# Patient Record
Sex: MALE | Race: WHITE | NOT HISPANIC OR LATINO | URBAN - METROPOLITAN AREA
[De-identification: names, ages, dates, MRNs, and addresses within clinical notes are randomized per-mention and may not be internally consistent; named-entity substitution may affect disease eponyms.]

---

## 2017-02-21 ENCOUNTER — EMERGENCY (EMERGENCY)
Facility: HOSPITAL | Age: 67
LOS: 0 days | Discharge: LEFT BEFORE DISCHARGE | End: 2017-02-21

## 2017-02-22 ENCOUNTER — TRANSCRIPTION ENCOUNTER (OUTPATIENT)
Age: 67
End: 2017-02-22

## 2017-06-27 DIAGNOSIS — R06.02 SHORTNESS OF BREATH: ICD-10-CM

## 2017-06-27 DIAGNOSIS — R05 COUGH: ICD-10-CM

## 2017-06-27 DIAGNOSIS — I10 ESSENTIAL (PRIMARY) HYPERTENSION: ICD-10-CM

## 2020-06-08 ENCOUNTER — EMERGENCY (EMERGENCY)
Facility: HOSPITAL | Age: 70
LOS: 0 days | Discharge: HOME | End: 2020-06-08
Attending: EMERGENCY MEDICINE | Admitting: EMERGENCY MEDICINE
Payer: MEDICARE

## 2020-06-08 VITALS
OXYGEN SATURATION: 98 % | RESPIRATION RATE: 18 BRPM | DIASTOLIC BLOOD PRESSURE: 66 MMHG | SYSTOLIC BLOOD PRESSURE: 118 MMHG | HEART RATE: 70 BPM | TEMPERATURE: 98 F
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
## 2020-06-08 DIAGNOSIS — S05.01XA INJURY OF CONJUNCTIVA AND CORNEAL ABRASION WITHOUT FOREIGN BODY, RIGHT EYE, INITIAL ENCOUNTER: ICD-10-CM

## 2020-06-08 DIAGNOSIS — I10 ESSENTIAL (PRIMARY) HYPERTENSION: ICD-10-CM

## 2020-06-08 DIAGNOSIS — E78.5 HYPERLIPIDEMIA, UNSPECIFIED: ICD-10-CM

## 2020-06-08 DIAGNOSIS — R20.2 PARESTHESIA OF SKIN: ICD-10-CM

## 2020-06-08 DIAGNOSIS — E11.9 TYPE 2 DIABETES MELLITUS WITHOUT COMPLICATIONS: ICD-10-CM
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
## 2020-06-08 DIAGNOSIS — S09.90XA UNSPECIFIED INJURY OF HEAD, INITIAL ENCOUNTER: ICD-10-CM

## 2020-06-08 PROCEDURE — 70450 CT HEAD/BRAIN W/O DYE: CPT | Mod: 26

## 2020-06-08 PROCEDURE — 72125 CT NECK SPINE W/O DYE: CPT | Mod: 26

## 2020-06-08 PROCEDURE — 99285 EMERGENCY DEPT VISIT HI MDM: CPT

## 2020-06-08 RX ORDER — POLYMYXIN B SULF/TRIMETHOPRIM 10000-1/ML
1 DROPS OPHTHALMIC (EYE) ONCE
Refills: 0 | Status: COMPLETED | OUTPATIENT
Start: 2020-06-08 | End: 2020-06-08

## 2020-06-08 RX ORDER — METHOCARBAMOL 500 MG/1
2 TABLET, FILM COATED ORAL
Qty: 42 | Refills: 0
Start: 2020-06-08 | End: 2020-06-14

## 2020-06-08 RX ADMIN — Medication 1 DROP(S): at 20:01

## 2020-06-08 RX ADMIN — Medication 1 DROP(S): at 20:17

## 2020-06-08 NOTE — ED PROVIDER NOTE - OBJECTIVE STATEMENT
pt is a 69 yom w/ 2 stents on plavix, DM, HLD here for accidental head trauma yesterday, mechanical. pt was bending down and accidentally knocked his head on his deck. pt states he may have gotten some debris in his eye at that time as well. pt now c/ b/l hand numbness, new today. went to  and came to ER for high suspicion of intracranial injury. denies blurry vision, n/v/d, LOC, chest pain

## 2020-06-08 NOTE — ED PROVIDER NOTE - CARE PLAN
Principal Discharge DX:	Head trauma  Secondary Diagnosis:	Closed head injury  Secondary Diagnosis:	Corneal abrasion  Secondary Diagnosis:	Paresthesia of both hands

## 2020-06-08 NOTE — ED PROVIDER NOTE - PATIENT PORTAL LINK FT
You can access the FollowMyHealth Patient Portal offered by [facility] by registering at the following website: [email or web address]. By joining [facility]’s FollowMyHealth portal, you will also be able to view your health information using other applications (apps) compatible with our system.

## 2020-06-08 NOTE — ED PROVIDER NOTE - CARE PROVIDER_API CALL
Khari Vega  Phone: (   )    -  Fax: (   )    -  Follow Up Time:     Khari Vega  [address] [facility], [address], [place], [place], [address]  Phone: (   )    -  Fax: (   )    -  Follow Up Time:

## 2020-06-08 NOTE — ED ADULT NURSE NOTE - OBJECTIVE STATEMENT
Pt hit head yesterday, c/o blurry vision and numbness in hands. Pt went to [facility] and was then sent to ER. Denies nausea/vomiting, LOC.

## 2020-06-08 NOTE — ED PROVIDER NOTE - ATTENDING CONTRIBUTION TO CARE
69 y.o. male, comes in for evaluation. Pt states he was bending down and hit his head on the deck. No LOC. No n/v/c/d. No double vision but reports excessive tearing to right eye. Pt is c/o tingling to b/l UE. No weakness. No difficulty walking. On exam, pt in NAD, AAOx3, head NC/AT, CN II-XII intact, PEERL, EOMi, eyes stained (+) corneal abrasion to right eye at 6 oclock, vision as per resident's note, neck (-) midline tenderness, lungs CTA B/L, CV S1S2 regular, abdomen soft/NT/ND/(+)BS, ext (-) edema, motor 5/5x4, sensation intact, good . CT reviewed. Will discharge with PMD follow up.

## 2020-06-08 NOTE — ED PROVIDER NOTE - PHYSICAL EXAMINATION
CONSTITUTIONAL: Well-developed; well-nourished; in no acute distress.   SKIN: warm, dry  HEAD: Normocephalic; atraumatic.  EYES: PERRL, EOMI, normal sclera and conjunctiva. vision 20/25 in both eyes.  ENT: No nasal discharge; airway clear.  NECK: Supple; non tender.  CARD: S1, S2 normal; no murmurs, gallops, or rubs. Regular rate and rhythm.   RESP: No wheezes, rales or rhonchi.  ABD: soft ntnd  EXT: Normal ROM.  No clubbing, cyanosis or edema.   LYMPH: No acute cervical adenopathy.  NEURO: Alert, oriented, grossly unremarkable. 5/5 strength in all 4 extremities. no cerebellar signs. normal gait  PSYCH: Cooperative, appropriate.

## 2020-06-08 NOTE — ED PROVIDER NOTE - NS ED ROS FT
Eyes:  No visual changes. + eye pain. no discharge.  ENMT:  No hearing changes, pain, discharge or infections. No neck pain or stiffness.  Cardiac:  No chest pain, SOB or edema. No chest pain with exertion.  Respiratory:  No cough or respiratory distress. No hemoptysis. No history of asthma or RAD.  GI:  No nausea, vomiting, diarrhea or abdominal pain.  :  No dysuria, frequency or burning.  MS:  No myalgia, muscle weakness, joint pain or back pain.  Neuro:  +headache. no weakness.  No LOC.  Skin:  No skin rash.   Endocrine: No history of thyroid disease or diabetes.

## 2020-06-08 NOTE — ED PROVIDER NOTE - PROVIDER TOKENS
FREE:[LAST:[Gary],FIRST:[Khari],PHONE:[(   )    -],FAX:[(   )    -]],FREE:[LAST:[Gary],FIRST:[Khari],PHONE:[(   )    -],FAX:[(   )    -],ADDRESS:[[address] [place] [address] [address], [place], [place], [address]]]

## 2020-06-08 NOTE — ED PROVIDER NOTE - NSFOLLOWUPINSTRUCTIONS_ED_ALL_ED_FT
Paresthesia  Paresthesia is a burning or prickling feeling. This feeling can happen in any part of the body. It often happens in the hands, arms, legs, or feet. Usually, it is not painful. In most cases, the feeling goes away in a short time and is not a sign of a serious problem. If you have paresthesia that lasts a long time, you may need to be seen by your doctor.    Follow these instructions at home:  Alcohol use     Do not drink alcohol if:  Your doctor tells you not to drink.   You are pregnant, may be pregnant, or are planning to become pregnant.  If you drink alcohol, limit how much you have:  0–1 drink a day for women.   0–2 drinks a day for men.  Be aware of how much alcohol is in your drink. In the [place] one drink equals one typical bottle of beer (12 oz), one-half glass of wine (5 oz), or one shot of hard liquor (1½ oz).  Nutrition     Eat a healthy diet. This includes:  Eating foods that have a lot of fiber in them, such as fresh fruits and vegetables, whole grains, and beans.  Limiting foods that have a lot of fat and processed sugars in them, such as fried or sweet foods.  General instructions     Take over-the-counter and prescription medicines only as told by your doctor.  Do not use any products that have nicotine or tobacco in them, such as cigarettes and e-cigarettes. If you need help quitting, ask your doctor.  If you have diabetes, work with your doctor to make sure your blood sugar stays in a healthy range.  If your feet feel numb:  Check for redness, warmth, and swelling every day.  Wear padded socks and comfortable shoes. These help protect your feet.  Keep all follow-up visits as told by your doctor. This is important.  Contact a doctor if:  You have paresthesia that gets worse or does not go away.  Your burning or prickling feeling gets worse when you walk.  You have pain or cramps.  You feel dizzy.  You have a rash.  Get help right away if you:  Feel weak.  Have trouble walking or moving.  Have problems speaking, understanding, or seeing.  Feel confused.  Cannot control when you pee (urinate) or poop (have a bowel movement).  Lose feeling (have numbness) after an injury.  Have new weakness in an arm or leg.  Pass out (faint).  Summary  Paresthesia is a burning or prickling feeling. It often happens in the hands, arms, legs, or feet.  In most cases, the feeling goes away in a short time and is not a sign of a serious problem.  If you have paresthesia that lasts a long time, you may need to be seen by your doctor.  This information is not intended to replace advice given to you by your health care provider. Make sure you discuss any questions you have with your health care provider.    Closed Head Injury    A closed head injury is an injury to your head that may or may not involve a traumatic brain injury (TBI). Symptoms of TBI can be short or long lasting and include headache, dizziness, interference with memory or speech, fatigue, confusion, changes in sleep, mood changes, nausea, depression/anxiety, and dulling of senses. Make sure to obtain proper rest which includes getting plenty of sleep, avoiding excessive visual stimulation, and avoiding activities that may cause physical or mental stress. Avoid any situation where there is potential for another head injury, including sports.    SEEK IMMEDIATE MEDICAL CARE IF YOU HAVE ANY OF THE FOLLOWING SYMPTOMS: unusual drowsiness, vomiting, severe dizziness, seizures, lightheadedness, muscular weakness, different pupil sizes, visual changes, or clear or bloody discharge from your ears or nose.    Corneal Abrasion    The cornea is the clear covering at the front and center of the eye. This very thin tissue is made up of many layers. If a scratch or injury causes the corneal epithelium to come off, it is called a corneal abrasion. Symptoms include eye pain, redness, tearing, difficulty keeping eye open, and light sensitivity. Do not drive or operate machinery if your eye is patched.  Antibiotic eye drops may be prescribed to reduce the risk of infection.  It is important to follow up with an ophthalmologist (eye doctor) to ensure proper healing.    SEEK IMMEDIATE MEDICAL CARE IF YOU HAVE ANY OF THE FOLLOWING SYMPTOMS: discharge from eyes, changes in vision, fever, or swelling.

## 2023-10-23 PROBLEM — I10 ESSENTIAL (PRIMARY) HYPERTENSION: Chronic | Status: ACTIVE | Noted: 2020-06-08

## 2023-10-23 PROBLEM — Z95.5 PRESENCE OF CORONARY ANGIOPLASTY IMPLANT AND GRAFT: Chronic | Status: ACTIVE | Noted: 2020-06-08

## 2023-10-23 PROBLEM — E11.9 TYPE 2 DIABETES MELLITUS WITHOUT COMPLICATIONS: Chronic | Status: ACTIVE | Noted: 2020-06-08

## 2023-10-25 RX ORDER — ATORVASTATIN CALCIUM 80 MG/1
0 TABLET, FILM COATED ORAL
Qty: 0 | Refills: 0 | DISCHARGE

## 2023-10-25 RX ORDER — TOPIRAMATE 25 MG
0 TABLET ORAL
Qty: 0 | Refills: 0 | DISCHARGE

## 2023-10-25 RX ORDER — PANTOPRAZOLE SODIUM 20 MG/1
0 TABLET, DELAYED RELEASE ORAL
Qty: 0 | Refills: 0 | DISCHARGE

## 2023-10-25 RX ORDER — METFORMIN HYDROCHLORIDE 850 MG/1
0 TABLET ORAL
Qty: 0 | Refills: 0 | DISCHARGE

## 2023-10-25 RX ORDER — RAMIPRIL 5 MG
0 CAPSULE ORAL
Qty: 0 | Refills: 0 | DISCHARGE

## 2023-10-25 RX ORDER — CLOPIDOGREL BISULFATE 75 MG/1
0 TABLET, FILM COATED ORAL
Qty: 0 | Refills: 0 | DISCHARGE

## 2023-10-26 VITALS
DIASTOLIC BLOOD PRESSURE: 73 MMHG | HEART RATE: 65 BPM | WEIGHT: 167.99 LBS | HEIGHT: 67 IN | SYSTOLIC BLOOD PRESSURE: 159 MMHG | RESPIRATION RATE: 14 BRPM | OXYGEN SATURATION: 97 %

## 2023-10-26 NOTE — H&P CARDIOLOGY - NSICDXPASTMEDICALHX_GEN_ALL_CORE_FT
PAST MEDICAL HISTORY:  CAD (coronary artery disease)     Diabetes     HLD (hyperlipidemia)     HTN (hypertension)     Stented coronary artery

## 2023-10-26 NOTE — H&P CARDIOLOGY - HISTORY OF PRESENT ILLNESS
72 y/o M with PMHx of HTN, HLD, DM-II, CAD with prior PCI/MARK, who presented to his cardiologist with c/o.................................   Denies any dizziness, n/v, diaphoresis, orthopnea, PND, palpitations, LE edema, syncope.  Pt is now referred for C with possible intervention if clinically indicated.      Pre cath note:    indication:  [ ] STEMI                [ ] NSTEMI                 [ ] Acute coronary syndrome                     [ ]Unstable Angina   [ ] high risk  [ ] intermediate risk  [ ] low risk                     [ ] Stable Angina     non-invasive testing:                          Date:                     result: [ ] high risk  [ ] intermediate risk  [ ] low risk    Anti- Anginal medications:                    [ ] not used                       [ ] used                   [ ] not used but strong indication not to use    Ejection Fraction                   [ ] <29            [ ] 30-39%   [ ] 40-49%     [ ]>50%    CHF                   [ ] active (within last 14 days on meds   [ ] Chronic (on meds but no exacerbation)    COPD                   [ ] mild (on chronic bronchodilators)  [ ] moderate (on chronic steroid therapy)      [ ] severe (indication for home O2 or PACO2 >50)    Other risk factors:                       [ ] Previous MI                     [ ] CVA/ stroke                    [ ] carotid stent/ CEA                    [ ] PVD/PAD- (arterial aneurysm, non-palpable pulses, tortuous vessel with inability to insert catheter, infra-renal dissection, renal or subclavian artery stenosis)                    [x ] diabetic                    [ ] previous CABG                    [ ] Renal Failure       Right Oral Test:    Adjusted Cath Bleeding Risk:     Pre-hydration:   72 y/o M with PMHx of HTN, HLD, DM-II, CAD with prior PCI/stents in 12/2007, who presented to his cardiologist with c/o chest pain and SOB.................................   Denies any dizziness, n/v, diaphoresis, orthopnea, PND, palpitations, LE edema, syncope.  He underwent a NST on 10/9/23 which revealed moderate reversible inferior wall ischemia, EF 54%.  Pt is now referred for [facility] with possible intervention if clinically indicated.      Pre cath note:    indication:  [ ] STEMI                [ ] NSTEMI                 [ ] Acute coronary syndrome                     [ ]Unstable Angina   [ ] high risk  [ ] intermediate risk  [ ] low risk                     [x ] Stable Angina     non-invasive testing: NST     Date:  10/9/23                result: [ ] high risk  [x ] intermediate risk  [ ] low risk    Anti- Anginal medications:                    [ ] not used                       [ ] used                   [ ] not used but strong indication not to use    Ejection Fraction                   [ ] <29            [ ] 30-39%   [ ] 40-49%     [ ]>50%    CHF                   [ ] active (within last 14 days on meds   [ ] Chronic (on meds but no exacerbation)    COPD                   [ ] mild (on chronic bronchodilators)  [ ] moderate (on chronic steroid therapy)      [ ] severe (indication for home O2 or PACO2 >50)    Other risk factors:                       [ ] Previous MI                     [ ] CVA/ stroke                    [ ] carotid stent/ CEA                    [ ] PVD/PAD- (arterial aneurysm, non-palpable pulses, tortuous vessel with inability to insert catheter, infra-renal dissection, renal or subclavian artery stenosis)                    [x ] diabetic                    [ ] previous CABG                    [ ] Renal Failure       Right Oral Test:    Adjusted Cath Bleeding Risk: 0.9%    Pre-hydration: Patient here for [facility]    72 y/o M with PMHx of HTN, HLD, DM-II, CAD with prior PCI/stents in 12/2007, who presented to his cardiologist with c/o chest pain and SOB. Denies any dizziness, n/v, diaphoresis, orthopnea, PND, palpitations, LE edema, syncope.  He underwent a NST on 10/9/23 which revealed moderate reversible inferior wall ischemia, EF 54%.  Pt is now referred for [facility] with possible intervention if clinically indicated.    FH: Mother - DM, Father - MI    Pre cath note:    indication:  [ ] STEMI                [ ] NSTEMI                 [ ] Acute coronary syndrome                     [ ]Unstable Angina   [ ] high risk  [ ] intermediate risk  [ ] low risk                     [x ] Stable Angina     non-invasive testing: NST     Date:  10/9/23                result: [ ] high risk  [x ] intermediate risk  [ ] low risk    Anti- Anginal medications:                    [ x] not used                       [ ] used                   [ ] not used but strong indication not to use    Ejection Fraction                   [ ] <29            [ ] 30-39%   [ ] 40-49%     [x ]>50%    CHF                   [ ] active (within last 14 days on meds   [ ] Chronic (on meds but no exacerbation)    COPD                   [ ] mild (on chronic bronchodilators)  [ ] moderate (on chronic steroid therapy)      [ ] severe (indication for home O2 or PACO2 >50)    Other risk factors:                       [ ] Previous MI                     [ ] CVA/ stroke                    [ ] carotid stent/ CEA                    [ ] PVD/PAD- (arterial aneurysm, non-palpable pulses, tortuous vessel with inability to insert catheter, infra-renal dissection, renal or subclavian artery stenosis)                    [x ] diabetic                    [ ] previous CABG                    [ ] Renal Failure       Right Oral Test: positive    Adjusted Cath Bleeding Risk: 0.9%    Pre-hydration: ns 250 cc/hr x1 hr Patient here for [facility]    74 y/o M with PMHx of HTN, HLD, DM-II, CAD with prior PCI/stents in 12/2007, who presented to his cardiologist with c/o chest pain and SOB. Denies any dizziness, n/v, diaphoresis, orthopnea, PND, palpitations, LE edema, syncope.  He underwent a NST on 10/9/23 which revealed moderate reversible inferior wall ischemia, EF 54%.  Pt is now referred for [facility] with possible intervention if clinically indicated.    FH: Mother - DM, Father - MI    Pre cath note:    indication:  [ ] STEMI                [ ] NSTEMI                 [ ] Acute coronary syndrome                     [ ]Unstable Angina   [ ] high risk  [ ] intermediate risk  [ ] low risk                     [x ] Stable Angina     non-invasive testing: NST     Date:  10/9/23                result: [ ] high risk  [x ] intermediate risk  [ ] low risk    Anti- Anginal medications:                    [ ] not used                       [ x] used CCC, Nitro                  [ ] not used but strong indication not to use    Ejection Fraction                   [ ] <29            [ ] 30-39%   [ ] 40-49%     [x ]>50%    CHF                   [ ] active (within last 14 days on meds   [ ] Chronic (on meds but no exacerbation)    COPD                   [ ] mild (on chronic bronchodilators)  [ ] moderate (on chronic steroid therapy)      [ ] severe (indication for home O2 or PACO2 >50)    Other risk factors:                       [ ] Previous MI                     [ ] CVA/ stroke                    [ ] carotid stent/ CEA                    [ ] PVD/PAD- (arterial aneurysm, non-palpable pulses, tortuous vessel with inability to insert catheter, infra-renal dissection, renal or subclavian artery stenosis)                    [x ] diabetic                    [ ] previous CABG                    [ ] Renal Failure       Right Oral Test: positive    Adjusted Cath Bleeding Risk: 0.9%    Pre-hydration: ns 250 cc/hr x1 hr

## 2023-10-26 NOTE — H&P CARDIOLOGY - NSICDXPASTSURGICALHX_GEN_ALL_CORE_FT
PAST SURGICAL HISTORY:  History of percutaneous angioplasty      PAST SURGICAL HISTORY:  H/O rotator cuff surgery     History of percutaneous angioplasty

## 2023-10-27 ENCOUNTER — TRANSCRIPTION ENCOUNTER (OUTPATIENT)
Age: 73
End: 2023-10-27

## 2023-10-27 ENCOUNTER — OUTPATIENT (OUTPATIENT)
Dept: OUTPATIENT SERVICES | Facility: HOSPITAL | Age: 73
LOS: 1 days | Discharge: HOME | End: 2023-10-27
Payer: MEDICARE

## 2023-10-27 VITALS
OXYGEN SATURATION: 98 % | RESPIRATION RATE: 14 BRPM | SYSTOLIC BLOOD PRESSURE: 107 MMHG | DIASTOLIC BLOOD PRESSURE: 74 MMHG

## 2023-10-27 DIAGNOSIS — Z98.890 OTHER SPECIFIED POSTPROCEDURAL STATES: Chronic | ICD-10-CM

## 2023-10-27 DIAGNOSIS — I25.10 ATHEROSCLEROTIC HEART DISEASE OF NATIVE CORONARY ARTERY WITHOUT ANGINA PECTORIS: ICD-10-CM

## 2023-10-27 LAB
ANION GAP SERPL CALC-SCNC: 11 MMOL/L — SIGNIFICANT CHANGE UP (ref 7–14)
ANION GAP SERPL CALC-SCNC: 11 MMOL/L — SIGNIFICANT CHANGE UP (ref 7–14)
BUN SERPL-MCNC: 15 MG/DL — SIGNIFICANT CHANGE UP (ref 10–20)
BUN SERPL-MCNC: 15 MG/DL — SIGNIFICANT CHANGE UP (ref 10–20)
CALCIUM SERPL-MCNC: 9.1 MG/DL — SIGNIFICANT CHANGE UP (ref 8.4–10.5)
CALCIUM SERPL-MCNC: 9.1 MG/DL — SIGNIFICANT CHANGE UP (ref 8.4–10.5)
CHLORIDE SERPL-SCNC: 107 MMOL/L — SIGNIFICANT CHANGE UP (ref 98–110)
CHLORIDE SERPL-SCNC: 107 MMOL/L — SIGNIFICANT CHANGE UP (ref 98–110)
CO2 SERPL-SCNC: 23 MMOL/L — SIGNIFICANT CHANGE UP (ref 17–32)
CO2 SERPL-SCNC: 23 MMOL/L — SIGNIFICANT CHANGE UP (ref 17–32)
CREAT SERPL-MCNC: 0.8 MG/DL — SIGNIFICANT CHANGE UP (ref 0.7–1.5)
CREAT SERPL-MCNC: 0.8 MG/DL — SIGNIFICANT CHANGE UP (ref 0.7–1.5)
EGFR: 93 ML/MIN/1.73M2 — SIGNIFICANT CHANGE UP
EGFR: 93 ML/MIN/1.73M2 — SIGNIFICANT CHANGE UP
GLUCOSE BLDC GLUCOMTR-MCNC: 152 MG/DL — HIGH (ref 70–99)
GLUCOSE BLDC GLUCOMTR-MCNC: 152 MG/DL — HIGH (ref 70–99)
GLUCOSE SERPL-MCNC: 165 MG/DL — HIGH (ref 70–99)
GLUCOSE SERPL-MCNC: 165 MG/DL — HIGH (ref 70–99)
HCT VFR BLD CALC: 38.6 % — LOW (ref 42–52)
HCT VFR BLD CALC: 38.6 % — LOW (ref 42–52)
HGB BLD-MCNC: 13.1 G/DL — LOW (ref 14–18)
HGB BLD-MCNC: 13.1 G/DL — LOW (ref 14–18)
MCHC RBC-ENTMCNC: 31 PG — SIGNIFICANT CHANGE UP (ref 27–31)
MCHC RBC-ENTMCNC: 31 PG — SIGNIFICANT CHANGE UP (ref 27–31)
MCHC RBC-ENTMCNC: 33.9 G/DL — SIGNIFICANT CHANGE UP (ref 32–37)
MCHC RBC-ENTMCNC: 33.9 G/DL — SIGNIFICANT CHANGE UP (ref 32–37)
MCV RBC AUTO: 91.3 FL — SIGNIFICANT CHANGE UP (ref 80–94)
MCV RBC AUTO: 91.3 FL — SIGNIFICANT CHANGE UP (ref 80–94)
NRBC # BLD: 0 /100 WBCS — SIGNIFICANT CHANGE UP (ref 0–0)
NRBC # BLD: 0 /100 WBCS — SIGNIFICANT CHANGE UP (ref 0–0)
PLATELET # BLD AUTO: 169 K/UL — SIGNIFICANT CHANGE UP (ref 130–400)
PLATELET # BLD AUTO: 169 K/UL — SIGNIFICANT CHANGE UP (ref 130–400)
PMV BLD: 9.9 FL — SIGNIFICANT CHANGE UP (ref 7.4–10.4)
PMV BLD: 9.9 FL — SIGNIFICANT CHANGE UP (ref 7.4–10.4)
POTASSIUM SERPL-MCNC: 4.2 MMOL/L — SIGNIFICANT CHANGE UP (ref 3.5–5)
POTASSIUM SERPL-MCNC: 4.2 MMOL/L — SIGNIFICANT CHANGE UP (ref 3.5–5)
POTASSIUM SERPL-SCNC: 4.2 MMOL/L — SIGNIFICANT CHANGE UP (ref 3.5–5)
POTASSIUM SERPL-SCNC: 4.2 MMOL/L — SIGNIFICANT CHANGE UP (ref 3.5–5)
RBC # BLD: 4.23 M/UL — LOW (ref 4.7–6.1)
RBC # BLD: 4.23 M/UL — LOW (ref 4.7–6.1)
RBC # FLD: 13.2 % — SIGNIFICANT CHANGE UP (ref 11.5–14.5)
RBC # FLD: 13.2 % — SIGNIFICANT CHANGE UP (ref 11.5–14.5)
SODIUM SERPL-SCNC: 141 MMOL/L — SIGNIFICANT CHANGE UP (ref 135–146)
SODIUM SERPL-SCNC: 141 MMOL/L — SIGNIFICANT CHANGE UP (ref 135–146)
WBC # BLD: 5.56 K/UL — SIGNIFICANT CHANGE UP (ref 4.8–10.8)
WBC # BLD: 5.56 K/UL — SIGNIFICANT CHANGE UP (ref 4.8–10.8)
WBC # FLD AUTO: 5.56 K/UL — SIGNIFICANT CHANGE UP (ref 4.8–10.8)
WBC # FLD AUTO: 5.56 K/UL — SIGNIFICANT CHANGE UP (ref 4.8–10.8)

## 2023-10-27 PROCEDURE — C1887: CPT

## 2023-10-27 PROCEDURE — 80048 BASIC METABOLIC PNL TOTAL CA: CPT

## 2023-10-27 PROCEDURE — 82962 GLUCOSE BLOOD TEST: CPT

## 2023-10-27 PROCEDURE — C1894: CPT

## 2023-10-27 PROCEDURE — 93458 L HRT ARTERY/VENTRICLE ANGIO: CPT | Mod: 26

## 2023-10-27 PROCEDURE — 93458 L HRT ARTERY/VENTRICLE ANGIO: CPT

## 2023-10-27 PROCEDURE — C1769: CPT

## 2023-10-27 PROCEDURE — 85027 COMPLETE CBC AUTOMATED: CPT

## 2023-10-27 PROCEDURE — 36415 COLL VENOUS BLD VENIPUNCTURE: CPT

## 2023-10-27 RX ORDER — ASPIRIN/CALCIUM CARB/MAGNESIUM 324 MG
324 TABLET ORAL ONCE
Refills: 0 | Status: COMPLETED | OUTPATIENT
Start: 2023-10-27 | End: 2023-10-27

## 2023-10-27 RX ORDER — ISOSORBIDE DINITRATE 5 MG/1
5 TABLET ORAL ONCE
Refills: 0 | Status: COMPLETED | OUTPATIENT
Start: 2023-10-27 | End: 2023-10-27

## 2023-10-27 RX ORDER — CLOPIDOGREL BISULFATE 75 MG/1
75 TABLET, FILM COATED ORAL ONCE
Refills: 0 | Status: COMPLETED | OUTPATIENT
Start: 2023-10-27 | End: 2023-10-27

## 2023-10-27 RX ORDER — SODIUM CHLORIDE 9 MG/ML
250 INJECTION INTRAMUSCULAR; INTRAVENOUS; SUBCUTANEOUS
Refills: 0 | Status: DISCONTINUED | OUTPATIENT
Start: 2023-10-27 | End: 2023-10-27

## 2023-10-27 RX ORDER — AMLODIPINE BESYLATE 2.5 MG/1
2.5 TABLET ORAL ONCE
Refills: 0 | Status: COMPLETED | OUTPATIENT
Start: 2023-10-27 | End: 2023-10-27

## 2023-10-27 RX ADMIN — CLOPIDOGREL BISULFATE 75 MILLIGRAM(S): 75 TABLET, FILM COATED ORAL at 11:35

## 2023-10-27 RX ADMIN — SODIUM CHLORIDE 250 MILLILITER(S): 9 INJECTION INTRAMUSCULAR; INTRAVENOUS; SUBCUTANEOUS at 11:36

## 2023-10-27 RX ADMIN — Medication 324 MILLIGRAM(S): at 11:36

## 2023-10-27 NOTE — ASU DISCHARGE PLAN (ADULT/PEDIATRIC) - ASU DC SPECIAL INSTRUCTIONSFT
Discharge Instructions as follows:  - Continue medical regimen as prescribed to prevent chest pain.  - If you are diabetic and taking medication containing Metformin, do not take them for 48 hours after the procedure  - Instructed to call 911 if chest pain, shortness of breath or bleeding from access site.  - No heavy lifting > 10lbs x 1 week.  - No driving x 24 hours.  - No baths, swimming pools x 1 week, may shower  - Low sodium low fat low cholesterol diet  - Follow-up with Cardiologist in 1-2 weeks after discharge  - Soreness or tenderness at the site is possible, it will diminish over time. You may take Tylenol every 4-6 hours as needed. Nothing stronger is needed. NO Motrin/Ibuprofen  - Any questions call cardiac cath lab [phone]

## 2023-10-27 NOTE — ASU DISCHARGE PLAN (ADULT/PEDIATRIC) - CARE PROVIDER_API CALL
Adonay Stevens  Cardiology  [address] [place] [id number]  Phone: [phone]  Fax: [phone]  Established Patient  Follow Up Time: 1 week

## 2023-10-27 NOTE — ASU PATIENT PROFILE, ADULT - FALL HARM RISK - UNIVERSAL INTERVENTIONS
Bed in lowest position, wheels locked, appropriate side rails in place/Call bell, personal items and telephone in reach/Instruct patient to call for assistance before getting out of bed or chair/Non-slip footwear when patient is out of bed/[place] to call system/Physically safe environment - no spills, clutter or unnecessary equipment/Purposeful Proactive Rounding/Room/bathroom lighting operational, light cord in reach

## 2023-10-27 NOTE — ASU DISCHARGE PLAN (ADULT/PEDIATRIC) - NS MD DC FALL RISK RISK
For information on Fall & Injury Prevention, visit: [email or web address] OR  [email or web address] OR  https://www.cdc.gov/steadi/patient.html

## 2023-10-27 NOTE — CHART NOTE - NSCHARTNOTEFT_GEN_A_CORE
PRE-OP DIAGNOSIS:      Stress test positive for inferior wall ischemia.  PROCEDURE:     [] Coronary Angiogram     [x] LHC     [] LVG     [] RHC     [] Intervention (see below)         PHYSICIAN:  Dr. Stevens    ASSISTANT:  Dr. Mcmullen       PROCEDURE DESCRIPTION:     Consent:      [x] Patient     [] Family Member     []  Used        Anesthesia:     [] General     [x] Sedation     [x] Local        Access & Closure:     [] 6Fr Radial Artery , Vasc band     [] Fr Femoral Artery     [] Fr Femoral Vein     [] Fr Brachial Vein       IV Contrast:70 mL        Intervention: None      Implants: None       FINDINGS:     Coronary Dominance: Right       LM: Minor luminal irregularities.      LAD:   P-LAD Mild disease.   Mid-LAD Mild disease.   Distal Lad Minor luminal irregularities.   D1 40 percent disease   CX:   P-LCX Minor luminal irregularities.   Distal LCX Minor luminal irregularities.   OM1 Mild disease.     RCA:   P-RCA PRIOR STENT IS PATENT   Mid RCA   with collaterals from LCX and LAD septal.     LVEDP: 9mmHg      EF 70 percent   ESTIMATED BLOOD LOSS: < 10 mL        CONDITION:     [x] Good     [] Fair     [] Critical        SPECIMEN REMOVED: N/A       POST-OP DIAGNOSIS:       of RCA , ISR.       PLAN OF CARE:     [x] D/C Home Today     [] Return to In-patient bed     [] Admit for observation     [] Return for Staged Procedure     [] CT Surgery Consult       [] IV Fluids: NS at 200 ml/hr for 2 hours.

## 2023-10-31 DIAGNOSIS — I25.82 CHRONIC TOTAL OCCLUSION OF CORONARY ARTERY: ICD-10-CM

## 2023-10-31 DIAGNOSIS — I25.10 ATHEROSCLEROTIC HEART DISEASE OF NATIVE CORONARY ARTERY WITHOUT ANGINA PECTORIS: ICD-10-CM

## 2023-10-31 DIAGNOSIS — R94.39 ABNORMAL RESULT OF OTHER CARDIOVASCULAR FUNCTION STUDY: ICD-10-CM

## 2023-12-06 ENCOUNTER — INPATIENT (INPATIENT)
Facility: HOSPITAL | Age: 73
LOS: 0 days | Discharge: HOME | DRG: 303 | End: 2023-12-07
Attending: INTERNAL MEDICINE | Admitting: INTERNAL MEDICINE
Payer: MEDICARE

## 2023-12-06 VITALS
OXYGEN SATURATION: 95 % | WEIGHT: 169.98 LBS | RESPIRATION RATE: 16 BRPM | TEMPERATURE: 99 F | HEART RATE: 74 BPM | DIASTOLIC BLOOD PRESSURE: 68 MMHG | SYSTOLIC BLOOD PRESSURE: 132 MMHG | HEIGHT: 67 IN

## 2023-12-06 DIAGNOSIS — R07.9 CHEST PAIN, UNSPECIFIED: ICD-10-CM

## 2023-12-06 DIAGNOSIS — Z98.890 OTHER SPECIFIED POSTPROCEDURAL STATES: Chronic | ICD-10-CM

## 2023-12-06 PROBLEM — I25.10 ATHEROSCLEROTIC HEART DISEASE OF NATIVE CORONARY ARTERY WITHOUT ANGINA PECTORIS: Chronic | Status: ACTIVE | Noted: 2023-10-26

## 2023-12-06 PROBLEM — E78.5 HYPERLIPIDEMIA, UNSPECIFIED: Chronic | Status: ACTIVE | Noted: 2023-10-26

## 2023-12-06 LAB
ALBUMIN SERPL ELPH-MCNC: 4 G/DL — SIGNIFICANT CHANGE UP (ref 3.5–5.2)
ALBUMIN SERPL ELPH-MCNC: 4 G/DL — SIGNIFICANT CHANGE UP (ref 3.5–5.2)
ALP SERPL-CCNC: 99 U/L — SIGNIFICANT CHANGE UP (ref 30–115)
ALP SERPL-CCNC: 99 U/L — SIGNIFICANT CHANGE UP (ref 30–115)
ALT FLD-CCNC: 19 U/L — SIGNIFICANT CHANGE UP (ref 0–41)
ALT FLD-CCNC: 19 U/L — SIGNIFICANT CHANGE UP (ref 0–41)
ANION GAP SERPL CALC-SCNC: 9 MMOL/L — SIGNIFICANT CHANGE UP (ref 7–14)
ANION GAP SERPL CALC-SCNC: 9 MMOL/L — SIGNIFICANT CHANGE UP (ref 7–14)
APTT BLD: 32.3 SEC — SIGNIFICANT CHANGE UP (ref 27–39.2)
APTT BLD: 32.3 SEC — SIGNIFICANT CHANGE UP (ref 27–39.2)
AST SERPL-CCNC: 19 U/L — SIGNIFICANT CHANGE UP (ref 0–41)
AST SERPL-CCNC: 19 U/L — SIGNIFICANT CHANGE UP (ref 0–41)
BASOPHILS # BLD AUTO: 0.08 K/UL — SIGNIFICANT CHANGE UP (ref 0–0.2)
BASOPHILS # BLD AUTO: 0.08 K/UL — SIGNIFICANT CHANGE UP (ref 0–0.2)
BASOPHILS NFR BLD AUTO: 1.5 % — HIGH (ref 0–1)
BASOPHILS NFR BLD AUTO: 1.5 % — HIGH (ref 0–1)
BILIRUB SERPL-MCNC: 0.3 MG/DL — SIGNIFICANT CHANGE UP (ref 0.2–1.2)
BILIRUB SERPL-MCNC: 0.3 MG/DL — SIGNIFICANT CHANGE UP (ref 0.2–1.2)
BUN SERPL-MCNC: 20 MG/DL — SIGNIFICANT CHANGE UP (ref 10–20)
BUN SERPL-MCNC: 20 MG/DL — SIGNIFICANT CHANGE UP (ref 10–20)
CALCIUM SERPL-MCNC: 8.9 MG/DL — SIGNIFICANT CHANGE UP (ref 8.4–10.5)
CALCIUM SERPL-MCNC: 8.9 MG/DL — SIGNIFICANT CHANGE UP (ref 8.4–10.5)
CHLORIDE SERPL-SCNC: 107 MMOL/L — SIGNIFICANT CHANGE UP (ref 98–110)
CHLORIDE SERPL-SCNC: 107 MMOL/L — SIGNIFICANT CHANGE UP (ref 98–110)
CO2 SERPL-SCNC: 22 MMOL/L — SIGNIFICANT CHANGE UP (ref 17–32)
CO2 SERPL-SCNC: 22 MMOL/L — SIGNIFICANT CHANGE UP (ref 17–32)
CREAT SERPL-MCNC: 0.9 MG/DL — SIGNIFICANT CHANGE UP (ref 0.7–1.5)
CREAT SERPL-MCNC: 0.9 MG/DL — SIGNIFICANT CHANGE UP (ref 0.7–1.5)
EGFR: 90 ML/MIN/1.73M2 — SIGNIFICANT CHANGE UP
EGFR: 90 ML/MIN/1.73M2 — SIGNIFICANT CHANGE UP
EOSINOPHIL # BLD AUTO: 0.14 K/UL — SIGNIFICANT CHANGE UP (ref 0–0.7)
EOSINOPHIL # BLD AUTO: 0.14 K/UL — SIGNIFICANT CHANGE UP (ref 0–0.7)
EOSINOPHIL NFR BLD AUTO: 2.6 % — SIGNIFICANT CHANGE UP (ref 0–8)
EOSINOPHIL NFR BLD AUTO: 2.6 % — SIGNIFICANT CHANGE UP (ref 0–8)
GLUCOSE BLDC GLUCOMTR-MCNC: 144 MG/DL — HIGH (ref 70–99)
GLUCOSE BLDC GLUCOMTR-MCNC: 144 MG/DL — HIGH (ref 70–99)
GLUCOSE SERPL-MCNC: 151 MG/DL — HIGH (ref 70–99)
GLUCOSE SERPL-MCNC: 151 MG/DL — HIGH (ref 70–99)
HCT VFR BLD CALC: 37.4 % — LOW (ref 42–52)
HCT VFR BLD CALC: 37.4 % — LOW (ref 42–52)
HGB BLD-MCNC: 12.7 G/DL — LOW (ref 14–18)
HGB BLD-MCNC: 12.7 G/DL — LOW (ref 14–18)
IMM GRANULOCYTES NFR BLD AUTO: 0.4 % — HIGH (ref 0.1–0.3)
IMM GRANULOCYTES NFR BLD AUTO: 0.4 % — HIGH (ref 0.1–0.3)
INR BLD: 0.98 RATIO — SIGNIFICANT CHANGE UP (ref 0.65–1.3)
INR BLD: 0.98 RATIO — SIGNIFICANT CHANGE UP (ref 0.65–1.3)
LYMPHOCYTES # BLD AUTO: 1.46 K/UL — SIGNIFICANT CHANGE UP (ref 1.2–3.4)
LYMPHOCYTES # BLD AUTO: 1.46 K/UL — SIGNIFICANT CHANGE UP (ref 1.2–3.4)
LYMPHOCYTES # BLD AUTO: 27.2 % — SIGNIFICANT CHANGE UP (ref 20.5–51.1)
LYMPHOCYTES # BLD AUTO: 27.2 % — SIGNIFICANT CHANGE UP (ref 20.5–51.1)
MCHC RBC-ENTMCNC: 31.4 PG — HIGH (ref 27–31)
MCHC RBC-ENTMCNC: 31.4 PG — HIGH (ref 27–31)
MCHC RBC-ENTMCNC: 34 G/DL — SIGNIFICANT CHANGE UP (ref 32–37)
MCHC RBC-ENTMCNC: 34 G/DL — SIGNIFICANT CHANGE UP (ref 32–37)
MCV RBC AUTO: 92.6 FL — SIGNIFICANT CHANGE UP (ref 80–94)
MCV RBC AUTO: 92.6 FL — SIGNIFICANT CHANGE UP (ref 80–94)
MONOCYTES # BLD AUTO: 0.47 K/UL — SIGNIFICANT CHANGE UP (ref 0.1–0.6)
MONOCYTES # BLD AUTO: 0.47 K/UL — SIGNIFICANT CHANGE UP (ref 0.1–0.6)
MONOCYTES NFR BLD AUTO: 8.8 % — SIGNIFICANT CHANGE UP (ref 1.7–9.3)
MONOCYTES NFR BLD AUTO: 8.8 % — SIGNIFICANT CHANGE UP (ref 1.7–9.3)
NEUTROPHILS # BLD AUTO: 3.19 K/UL — SIGNIFICANT CHANGE UP (ref 1.4–6.5)
NEUTROPHILS # BLD AUTO: 3.19 K/UL — SIGNIFICANT CHANGE UP (ref 1.4–6.5)
NEUTROPHILS NFR BLD AUTO: 59.5 % — SIGNIFICANT CHANGE UP (ref 42.2–75.2)
NEUTROPHILS NFR BLD AUTO: 59.5 % — SIGNIFICANT CHANGE UP (ref 42.2–75.2)
NRBC # BLD: 0 /100 WBCS — SIGNIFICANT CHANGE UP (ref 0–0)
NRBC # BLD: 0 /100 WBCS — SIGNIFICANT CHANGE UP (ref 0–0)
NT-PROBNP SERPL-SCNC: 61 PG/ML — SIGNIFICANT CHANGE UP (ref 0–300)
NT-PROBNP SERPL-SCNC: 61 PG/ML — SIGNIFICANT CHANGE UP (ref 0–300)
PLATELET # BLD AUTO: 185 K/UL — SIGNIFICANT CHANGE UP (ref 130–400)
PLATELET # BLD AUTO: 185 K/UL — SIGNIFICANT CHANGE UP (ref 130–400)
PMV BLD: 9.7 FL — SIGNIFICANT CHANGE UP (ref 7.4–10.4)
PMV BLD: 9.7 FL — SIGNIFICANT CHANGE UP (ref 7.4–10.4)
POTASSIUM SERPL-MCNC: 4.5 MMOL/L — SIGNIFICANT CHANGE UP (ref 3.5–5)
POTASSIUM SERPL-MCNC: 4.5 MMOL/L — SIGNIFICANT CHANGE UP (ref 3.5–5)
POTASSIUM SERPL-SCNC: 4.5 MMOL/L — SIGNIFICANT CHANGE UP (ref 3.5–5)
POTASSIUM SERPL-SCNC: 4.5 MMOL/L — SIGNIFICANT CHANGE UP (ref 3.5–5)
PROT SERPL-MCNC: 7 G/DL — SIGNIFICANT CHANGE UP (ref 6–8)
PROT SERPL-MCNC: 7 G/DL — SIGNIFICANT CHANGE UP (ref 6–8)
PROTHROM AB SERPL-ACNC: 11.2 SEC — SIGNIFICANT CHANGE UP (ref 9.95–12.87)
PROTHROM AB SERPL-ACNC: 11.2 SEC — SIGNIFICANT CHANGE UP (ref 9.95–12.87)
RBC # BLD: 4.04 M/UL — LOW (ref 4.7–6.1)
RBC # BLD: 4.04 M/UL — LOW (ref 4.7–6.1)
RBC # FLD: 13.2 % — SIGNIFICANT CHANGE UP (ref 11.5–14.5)
RBC # FLD: 13.2 % — SIGNIFICANT CHANGE UP (ref 11.5–14.5)
SODIUM SERPL-SCNC: 138 MMOL/L — SIGNIFICANT CHANGE UP (ref 135–146)
SODIUM SERPL-SCNC: 138 MMOL/L — SIGNIFICANT CHANGE UP (ref 135–146)
TROPONIN T SERPL-MCNC: <0.01 NG/ML — SIGNIFICANT CHANGE UP
WBC # BLD: 5.36 K/UL — SIGNIFICANT CHANGE UP (ref 4.8–10.8)
WBC # BLD: 5.36 K/UL — SIGNIFICANT CHANGE UP (ref 4.8–10.8)
WBC # FLD AUTO: 5.36 K/UL — SIGNIFICANT CHANGE UP (ref 4.8–10.8)
WBC # FLD AUTO: 5.36 K/UL — SIGNIFICANT CHANGE UP (ref 4.8–10.8)

## 2023-12-06 PROCEDURE — 36415 COLL VENOUS BLD VENIPUNCTURE: CPT

## 2023-12-06 PROCEDURE — 85025 COMPLETE CBC W/AUTO DIFF WBC: CPT

## 2023-12-06 PROCEDURE — 99222 1ST HOSP IP/OBS MODERATE 55: CPT

## 2023-12-06 PROCEDURE — 86803 HEPATITIS C AB TEST: CPT

## 2023-12-06 PROCEDURE — 84484 ASSAY OF TROPONIN QUANT: CPT

## 2023-12-06 PROCEDURE — 99285 EMERGENCY DEPT VISIT HI MDM: CPT

## 2023-12-06 PROCEDURE — 71045 X-RAY EXAM CHEST 1 VIEW: CPT | Mod: 26

## 2023-12-06 PROCEDURE — 84443 ASSAY THYROID STIM HORMONE: CPT

## 2023-12-06 PROCEDURE — 80061 LIPID PANEL: CPT

## 2023-12-06 PROCEDURE — 82962 GLUCOSE BLOOD TEST: CPT

## 2023-12-06 PROCEDURE — 83036 HEMOGLOBIN GLYCOSYLATED A1C: CPT

## 2023-12-06 PROCEDURE — 80053 COMPREHEN METABOLIC PANEL: CPT

## 2023-12-06 RX ORDER — GABAPENTIN 400 MG/1
300 CAPSULE ORAL
Refills: 0 | Status: DISCONTINUED | OUTPATIENT
Start: 2023-12-06 | End: 2023-12-07

## 2023-12-06 RX ORDER — TOPIRAMATE 25 MG
100 TABLET ORAL DAILY
Refills: 0 | Status: DISCONTINUED | OUTPATIENT
Start: 2023-12-06 | End: 2023-12-07

## 2023-12-06 RX ORDER — DEXTROSE 50 % IN WATER 50 %
25 SYRINGE (ML) INTRAVENOUS ONCE
Refills: 0 | Status: DISCONTINUED | OUTPATIENT
Start: 2023-12-06 | End: 2023-12-07

## 2023-12-06 RX ORDER — ATORVASTATIN CALCIUM 80 MG/1
80 TABLET, FILM COATED ORAL AT BEDTIME
Refills: 0 | Status: DISCONTINUED | OUTPATIENT
Start: 2023-12-06 | End: 2023-12-07

## 2023-12-06 RX ORDER — METOPROLOL TARTRATE 50 MG
25 TABLET ORAL
Refills: 0 | Status: DISCONTINUED | OUTPATIENT
Start: 2023-12-06 | End: 2023-12-07

## 2023-12-06 RX ORDER — SODIUM CHLORIDE 9 MG/ML
1000 INJECTION, SOLUTION INTRAVENOUS
Refills: 0 | Status: DISCONTINUED | OUTPATIENT
Start: 2023-12-06 | End: 2023-12-07

## 2023-12-06 RX ORDER — INSULIN LISPRO 100/ML
VIAL (ML) SUBCUTANEOUS
Refills: 0 | Status: DISCONTINUED | OUTPATIENT
Start: 2023-12-06 | End: 2023-12-07

## 2023-12-06 RX ORDER — TOPIRAMATE 25 MG
1 TABLET ORAL
Refills: 0 | DISCHARGE

## 2023-12-06 RX ORDER — ASPIRIN/CALCIUM CARB/MAGNESIUM 324 MG
325 TABLET ORAL ONCE
Refills: 0 | Status: COMPLETED | OUTPATIENT
Start: 2023-12-06 | End: 2023-12-06

## 2023-12-06 RX ORDER — METFORMIN HYDROCHLORIDE 850 MG/1
1 TABLET ORAL
Refills: 0 | DISCHARGE

## 2023-12-06 RX ORDER — ASPIRIN/CALCIUM CARB/MAGNESIUM 324 MG
81 TABLET ORAL DAILY
Refills: 0 | Status: DISCONTINUED | OUTPATIENT
Start: 2023-12-07 | End: 2023-12-07

## 2023-12-06 RX ORDER — DEXTROSE 50 % IN WATER 50 %
15 SYRINGE (ML) INTRAVENOUS ONCE
Refills: 0 | Status: DISCONTINUED | OUTPATIENT
Start: 2023-12-06 | End: 2023-12-07

## 2023-12-06 RX ORDER — GABAPENTIN 400 MG/1
1 CAPSULE ORAL
Refills: 0 | DISCHARGE

## 2023-12-06 RX ORDER — GLUCAGON INJECTION, SOLUTION 0.5 MG/.1ML
1 INJECTION, SOLUTION SUBCUTANEOUS ONCE
Refills: 0 | Status: DISCONTINUED | OUTPATIENT
Start: 2023-12-06 | End: 2023-12-07

## 2023-12-06 RX ORDER — RANOLAZINE 500 MG/1
500 TABLET, FILM COATED, EXTENDED RELEASE ORAL
Refills: 0 | Status: DISCONTINUED | OUTPATIENT
Start: 2023-12-06 | End: 2023-12-07

## 2023-12-06 RX ORDER — CLOPIDOGREL BISULFATE 75 MG/1
75 TABLET, FILM COATED ORAL DAILY
Refills: 0 | Status: DISCONTINUED | OUTPATIENT
Start: 2023-12-06 | End: 2023-12-07

## 2023-12-06 RX ORDER — ACETAMINOPHEN 500 MG
650 TABLET ORAL EVERY 6 HOURS
Refills: 0 | Status: DISCONTINUED | OUTPATIENT
Start: 2023-12-06 | End: 2023-12-07

## 2023-12-06 RX ORDER — ENOXAPARIN SODIUM 100 MG/ML
40 INJECTION SUBCUTANEOUS EVERY 24 HOURS
Refills: 0 | Status: DISCONTINUED | OUTPATIENT
Start: 2023-12-06 | End: 2023-12-07

## 2023-12-06 RX ADMIN — RANOLAZINE 500 MILLIGRAM(S): 500 TABLET, FILM COATED, EXTENDED RELEASE ORAL at 21:57

## 2023-12-06 RX ADMIN — Medication 25 MILLIGRAM(S): at 18:48

## 2023-12-06 RX ADMIN — GABAPENTIN 300 MILLIGRAM(S): 400 CAPSULE ORAL at 18:41

## 2023-12-06 RX ADMIN — Medication 325 MILLIGRAM(S): at 17:05

## 2023-12-06 RX ADMIN — ATORVASTATIN CALCIUM 80 MILLIGRAM(S): 80 TABLET, FILM COATED ORAL at 23:07

## 2023-12-06 NOTE — ED ADULT NURSE NOTE - NS ED NURSE REPORT GIVEN DT
Called to follow up, no answer or voice mail- will send message through My chart  
06-Dec-2023 21:46

## 2023-12-06 NOTE — ED PROVIDER NOTE - OBJECTIVE STATEMENT
73-year-old male with history of CAD status post stents, hypertension, high cholesterol, diabetes presents to the ED complaining of chest squeezing with shortness of breath that started last night.  Patient stated had similar symptoms this morning.  Patient also complaining of muscle cramps to her arms and legs.  No cough, fever, chills,  or leg swelling.

## 2023-12-06 NOTE — H&P ADULT - HISTORY OF PRESENT ILLNESS
73-year-old male with history of CAD status post stents, hypertension, high cholesterol, diabetes presents to the ED complaining of chest squeezing with shortness of breath that started last night.  Patient stated had similar symptoms this morning.  Patient also complaining of muscle cramps to her arms and legs.      In the ED pts VS were T(C): 37.2  T(F): 98.9  HR: 74  BP: 132/68  RR: 16  SpO2: 95%  Trops negative x 1      Pt is admitted for further workup and management of CP   73-year-old male with history of CAD status post stents in 2007, recent cath in October with Dr Stevens with  of RCA and instant restenosis, hypertension, HLD, Diabetes presents to the ED complaining of pressure like CP with shortness of breath that started last night.  Patient stated had similar symptoms this morning. Pt states the pain as 6/10 without alleviating or aggravating factors. Currently pt is not c/o Pain.      In the ED pts VS were T(C): 37.2  T(F): 98.9  HR: 74  BP: 132/68  RR: 16  SpO2: 95%  Trops negative x 1      Pt is admitted for further workup and management of CP

## 2023-12-06 NOTE — H&P ADULT - ASSESSMENT
## Typical/Atypical Chest Pain. Unstable Angina, NSTEMI  - Stable Angina, typical symptoms >2 months   - check serial EKGs and Johana   - check HbA1c, lipid profile and 2 D ehco  - Smoker, family hx of CAD  - start aspirin 325mg stat and then 81 mg qd  - start lipitor 40mg qhs  - start metoprolol 25mg BID  - start Plavix 75mg qd after 300mg stat loading dose for ACS (UA, NSTEMI)  - Keep NPO for CCTA vs Nuclear Stress Test (Atypical chest pain with negative trops X 2)  - cardio eval for possible cath. (UA, NSTEMI)      #Misc  - DVT Prophylaxis: Heparin S/C Lovenox S/C  - GI Prophylaxis: Protonix Not needed  - Diet: Regular DASH Carb Consistent Renal  - Activity: Increase as tolerated  - Dispo:      INCOMPLETE NOTE 73-year-old male with history of CAD status post stents in 2007, recent cath in October with Dr Stevens with  of RCA and instant restenosis, hypertension, HLD, Diabetes presents to the ED complaining of pressure like CP with shortness of breath that started last night.  Patient stated had similar symptoms this morning. Pt states the pain as 6/10 without alleviating or aggravating factors. Currently pt is not c/o Pain.      # Atypical Chest Pain  - Trops negative x 1  - check serial EKGs and Johana   - check HbA1c, lipid profile and 2 D ehco  - start aspirin 325mg stat and then 81 mg qd  - /w atrvastatin 80 mg  - start metoprolol 25mg BID  - c/w plavix  - Cardio cs      # HLD  - C/w atorvastatin 80    # DM II  - hold home meds  - start SS  - check HbA1c  - monitor BG      #Misc  - DVT Prophylaxis:  Lovenox S/C  - GI Prophylaxis:  Not needed  - Diet: DASH  73-year-old male with history of CAD status post stents in 2007, recent cath in October with Dr Stevens with  of RCA and instant restenosis, hypertension, HLD, Diabetes presents to the ED complaining of pressure like CP with shortness of breath that started last night.  Patient stated had similar symptoms this morning. Pt states the pain as 6/10 without alleviating or aggravating factors. Currently pt is not c/o Pain.      # Atypical Chest Pain likely Unstable angina  - Trops negative x 1  - check serial EKGs and Johana   - check HbA1c, lipid profile and 2 D ehco  - start aspirin 325mg stat and then 81 mg qd  - /w atrvastatin 80 mg  - start metoprolol 25mg BID  - c/w plavix  - Give Nitroglycerine PRN  - Cardio cs      # HLD  - C/w atorvastatin 80    # DM II  - hold home meds  - start SS  - check HbA1c  - monitor BG      #Misc  - DVT Prophylaxis:  Lovenox S/C  - GI Prophylaxis:  Not needed  - Diet: DASH  73-year-old male with history of CAD status post stents in 2007, recent cath in October with Dr Stevens with  of RCA and instant restenosis, hypertension, HLD, Diabetes presents to the ED complaining of pressure like CP with shortness of breath that started last night.  Patient stated had similar symptoms this morning. Pt states the pain as 6/10 without alleviating or aggravating factors. Currently pt is not c/o Pain.      # Atypical Chest Pain likely Unstable angina  - Trops negative x 1  - check serial EKGs and Johana   - check HbA1c, lipid profile and 2 D ehco  - start aspirin 325mg stat and then 81 mg qd  - c/w atrvastatin 80 mg  - start metoprolol 25mg BID  - c/w plavix  - Give Nitroglycerine PRN  - Cardio cs      # HLD  - C/w atorvastatin 80    # DM II  - hold home meds  - start SS  - check HbA1c  - monitor BG      #Misc  - DVT Prophylaxis:  Lovenox S/C  - GI Prophylaxis:  Not needed  - Diet: DASH

## 2023-12-06 NOTE — ED ADULT NURSE NOTE - OBJECTIVE STATEMENT
pt c/o chest pain started last night. Has been following up with Dr. Stevens, supposed to have stents placed in a month.

## 2023-12-06 NOTE — ED ADULT NURSE NOTE - NSFALLUNIVINTERV_ED_ALL_ED
Bed/Stretcher in lowest position, wheels locked, appropriate side rails in place/Call bell, personal items and telephone in reach/Instruct patient to call for assistance before getting out of bed/chair/stretcher/Non-slip footwear applied when patient is off stretcher/[place] to call system/Physically safe environment - no spills, clutter or unnecessary equipment/Purposeful proactive rounding/Room/bathroom lighting operational, light cord in reach Bed/Stretcher in lowest position, wheels locked, appropriate side rails in place/Call bell, personal items and telephone in reach/Instruct patient to call for assistance before getting out of bed/chair/stretcher/Non-slip footwear applied when patient is off stretcher/[place] to call system/Physically safe environment - no spills, clutter or unnecessary equipment/Purposeful proactive rounding/Room/bathroom lighting operational, light cord in reach

## 2023-12-06 NOTE — ED PROVIDER NOTE - CLINICAL SUMMARY MEDICAL DECISION MAKING FREE TEXT BOX
73-year-old male history of CAD PCI hypertension dyslipidemia diabetes presenting for evaluation of chest discomfort with associated shortness of breath since last night.  No lower extremity pain or swelling.  Today complained of mild cramps to the bilateral lower extremities.  Well appearing, NAD, non toxic. NCAT PERRLA EOMI neck supple non tender normal wob cta bl rrr abdomen s nt nd no rebound no guarding WWPx4 neuro non focal.  Labs EKG imaging reviewed.  Per patient and family, during recent cath, was told by cardiology that patient had 100% blockage and would need another procedure.,  Subsequently scheduled for mid December.  Will admit for further evaluation.

## 2023-12-06 NOTE — ED ADULT NURSE REASSESSMENT NOTE - NS ED NURSE REASSESS COMMENT FT1
Pt received from previous shift RN seen and assessed. Aox4, voiced no complaints at this time. Safety precautions maintained.

## 2023-12-06 NOTE — H&P ADULT - ATTENDING COMMENTS
73-year-old male with history of CAD status post stents in 2007, recent cath in October with Dr Stevens with  of RCA and instant restenosis, hypertension, HLD, Diabetes presents to the ED complaining of pressure like CP with shortness of breath that started last night.  Patient stated had similar symptoms this morning. Pt states the pain as 6/10 without alleviating or aggravating factors. Currently pt is not c/o Pain. Vitals stable. Exam: non-reproducible CP. Labs and imaging reviewed.    # Atypical Chest Pain likely stable vs unstable angina  - Trops negative x 1  - check serial EKGs and Johana   - check HbA1c, lipid profile and 2 D echo  - start aspirin 325mg stat and then 81 mg qd  - c/w atorvastatin 80 mg  - start metoprolol 25mg BID  - c/w Plavix  - Give Nitroglycerine PRN  - tele monitoring  - Cardio cs    # HLD  - C/w atorvastatin 80    # DM II  - hold home meds  - start SS  - check HbA1c  - monitor BG    Seen and examined patient on 12/6/2023

## 2023-12-06 NOTE — H&P ADULT - NSHPLABSRESULTS_GEN_ALL_CORE
12.7   5.36  )-----------( 185      ( 06 Dec 2023 10:10 )             37.4       12-06    138  |  107  |  20  ----------------------------<  151<H>  4.5   |  22  |  0.9    Ca    8.9      06 Dec 2023 10:10    TPro  7.0  /  Alb  4.0  /  TBili  0.3  /  DBili  x   /  AST  19  /  ALT  19  /  AlkPhos  99  12-06              Urinalysis Basic - ( 06 Dec 2023 10:10 )    Color: x / Appearance: x / SG: x / pH: x  Gluc: 151 mg/dL / Ketone: x  / Bili: x / Urobili: x   Blood: x / Protein: x / Nitrite: x   Leuk Esterase: x / RBC: x / WBC x   Sq Epi: x / Non Sq Epi: x / Bacteria: x        PT/INR - ( 06 Dec 2023 10:10 )   PT: 11.20 sec;   INR: 0.98 ratio         PTT - ( 06 Dec 2023 10:10 )  PTT:32.3 sec    Lactate Trend      CARDIAC MARKERS ( 06 Dec 2023 10:10 )  x     / <0.01 ng/mL / x     / x     / x            CAPILLARY BLOOD GLUCOSE

## 2023-12-07 ENCOUNTER — TRANSCRIPTION ENCOUNTER (OUTPATIENT)
Age: 73
End: 2023-12-07

## 2023-12-07 VITALS
SYSTOLIC BLOOD PRESSURE: 168 MMHG | DIASTOLIC BLOOD PRESSURE: 74 MMHG | WEIGHT: 170.64 LBS | HEART RATE: 62 BPM | TEMPERATURE: 96 F

## 2023-12-07 LAB
A1C WITH ESTIMATED AVERAGE GLUCOSE RESULT: 8.8 % — HIGH (ref 4–5.6)
A1C WITH ESTIMATED AVERAGE GLUCOSE RESULT: 8.8 % — HIGH (ref 4–5.6)
ALBUMIN SERPL ELPH-MCNC: 4.3 G/DL — SIGNIFICANT CHANGE UP (ref 3.5–5.2)
ALBUMIN SERPL ELPH-MCNC: 4.3 G/DL — SIGNIFICANT CHANGE UP (ref 3.5–5.2)
ALP SERPL-CCNC: 100 U/L — SIGNIFICANT CHANGE UP (ref 30–115)
ALP SERPL-CCNC: 100 U/L — SIGNIFICANT CHANGE UP (ref 30–115)
ALT FLD-CCNC: 16 U/L — SIGNIFICANT CHANGE UP (ref 0–41)
ALT FLD-CCNC: 16 U/L — SIGNIFICANT CHANGE UP (ref 0–41)
ANION GAP SERPL CALC-SCNC: 12 MMOL/L — SIGNIFICANT CHANGE UP (ref 7–14)
ANION GAP SERPL CALC-SCNC: 12 MMOL/L — SIGNIFICANT CHANGE UP (ref 7–14)
AST SERPL-CCNC: 15 U/L — SIGNIFICANT CHANGE UP (ref 0–41)
AST SERPL-CCNC: 15 U/L — SIGNIFICANT CHANGE UP (ref 0–41)
BASOPHILS # BLD AUTO: 0.07 K/UL — SIGNIFICANT CHANGE UP (ref 0–0.2)
BASOPHILS # BLD AUTO: 0.07 K/UL — SIGNIFICANT CHANGE UP (ref 0–0.2)
BASOPHILS NFR BLD AUTO: 1.3 % — HIGH (ref 0–1)
BASOPHILS NFR BLD AUTO: 1.3 % — HIGH (ref 0–1)
BILIRUB SERPL-MCNC: 0.3 MG/DL — SIGNIFICANT CHANGE UP (ref 0.2–1.2)
BILIRUB SERPL-MCNC: 0.3 MG/DL — SIGNIFICANT CHANGE UP (ref 0.2–1.2)
BUN SERPL-MCNC: 17 MG/DL — SIGNIFICANT CHANGE UP (ref 10–20)
BUN SERPL-MCNC: 17 MG/DL — SIGNIFICANT CHANGE UP (ref 10–20)
CALCIUM SERPL-MCNC: 9.4 MG/DL — SIGNIFICANT CHANGE UP (ref 8.4–10.5)
CALCIUM SERPL-MCNC: 9.4 MG/DL — SIGNIFICANT CHANGE UP (ref 8.4–10.5)
CHLORIDE SERPL-SCNC: 106 MMOL/L — SIGNIFICANT CHANGE UP (ref 98–110)
CHLORIDE SERPL-SCNC: 106 MMOL/L — SIGNIFICANT CHANGE UP (ref 98–110)
CHOLEST SERPL-MCNC: 154 MG/DL — SIGNIFICANT CHANGE UP
CHOLEST SERPL-MCNC: 154 MG/DL — SIGNIFICANT CHANGE UP
CO2 SERPL-SCNC: 23 MMOL/L — SIGNIFICANT CHANGE UP (ref 17–32)
CO2 SERPL-SCNC: 23 MMOL/L — SIGNIFICANT CHANGE UP (ref 17–32)
CREAT SERPL-MCNC: 1 MG/DL — SIGNIFICANT CHANGE UP (ref 0.7–1.5)
CREAT SERPL-MCNC: 1 MG/DL — SIGNIFICANT CHANGE UP (ref 0.7–1.5)
EGFR: 79 ML/MIN/1.73M2 — SIGNIFICANT CHANGE UP
EGFR: 79 ML/MIN/1.73M2 — SIGNIFICANT CHANGE UP
EOSINOPHIL # BLD AUTO: 0.12 K/UL — SIGNIFICANT CHANGE UP (ref 0–0.7)
EOSINOPHIL # BLD AUTO: 0.12 K/UL — SIGNIFICANT CHANGE UP (ref 0–0.7)
EOSINOPHIL NFR BLD AUTO: 2.3 % — SIGNIFICANT CHANGE UP (ref 0–8)
EOSINOPHIL NFR BLD AUTO: 2.3 % — SIGNIFICANT CHANGE UP (ref 0–8)
ESTIMATED AVERAGE GLUCOSE: 206 MG/DL — HIGH (ref 68–114)
ESTIMATED AVERAGE GLUCOSE: 206 MG/DL — HIGH (ref 68–114)
GLUCOSE BLDC GLUCOMTR-MCNC: 171 MG/DL — HIGH (ref 70–99)
GLUCOSE BLDC GLUCOMTR-MCNC: 171 MG/DL — HIGH (ref 70–99)
GLUCOSE BLDC GLUCOMTR-MCNC: 204 MG/DL — HIGH (ref 70–99)
GLUCOSE BLDC GLUCOMTR-MCNC: 204 MG/DL — HIGH (ref 70–99)
GLUCOSE SERPL-MCNC: 186 MG/DL — HIGH (ref 70–99)
GLUCOSE SERPL-MCNC: 186 MG/DL — HIGH (ref 70–99)
HCT VFR BLD CALC: 38.1 % — LOW (ref 42–52)
HCT VFR BLD CALC: 38.1 % — LOW (ref 42–52)
HCV AB S/CO SERPL IA: 0.04 COI — SIGNIFICANT CHANGE UP
HCV AB S/CO SERPL IA: 0.04 COI — SIGNIFICANT CHANGE UP
HCV AB SERPL-IMP: SIGNIFICANT CHANGE UP
HCV AB SERPL-IMP: SIGNIFICANT CHANGE UP
HDLC SERPL-MCNC: 38 MG/DL — LOW
HDLC SERPL-MCNC: 38 MG/DL — LOW
HGB BLD-MCNC: 12.9 G/DL — LOW (ref 14–18)
HGB BLD-MCNC: 12.9 G/DL — LOW (ref 14–18)
IMM GRANULOCYTES NFR BLD AUTO: 0.4 % — HIGH (ref 0.1–0.3)
IMM GRANULOCYTES NFR BLD AUTO: 0.4 % — HIGH (ref 0.1–0.3)
LIPID PNL WITH DIRECT LDL SERPL: 69 MG/DL — SIGNIFICANT CHANGE UP
LIPID PNL WITH DIRECT LDL SERPL: 69 MG/DL — SIGNIFICANT CHANGE UP
LYMPHOCYTES # BLD AUTO: 1.52 K/UL — SIGNIFICANT CHANGE UP (ref 1.2–3.4)
LYMPHOCYTES # BLD AUTO: 1.52 K/UL — SIGNIFICANT CHANGE UP (ref 1.2–3.4)
LYMPHOCYTES # BLD AUTO: 28.8 % — SIGNIFICANT CHANGE UP (ref 20.5–51.1)
LYMPHOCYTES # BLD AUTO: 28.8 % — SIGNIFICANT CHANGE UP (ref 20.5–51.1)
MCHC RBC-ENTMCNC: 31.6 PG — HIGH (ref 27–31)
MCHC RBC-ENTMCNC: 31.6 PG — HIGH (ref 27–31)
MCHC RBC-ENTMCNC: 33.9 G/DL — SIGNIFICANT CHANGE UP (ref 32–37)
MCHC RBC-ENTMCNC: 33.9 G/DL — SIGNIFICANT CHANGE UP (ref 32–37)
MCV RBC AUTO: 93.4 FL — SIGNIFICANT CHANGE UP (ref 80–94)
MCV RBC AUTO: 93.4 FL — SIGNIFICANT CHANGE UP (ref 80–94)
MONOCYTES # BLD AUTO: 0.51 K/UL — SIGNIFICANT CHANGE UP (ref 0.1–0.6)
MONOCYTES # BLD AUTO: 0.51 K/UL — SIGNIFICANT CHANGE UP (ref 0.1–0.6)
MONOCYTES NFR BLD AUTO: 9.7 % — HIGH (ref 1.7–9.3)
MONOCYTES NFR BLD AUTO: 9.7 % — HIGH (ref 1.7–9.3)
NEUTROPHILS # BLD AUTO: 3.04 K/UL — SIGNIFICANT CHANGE UP (ref 1.4–6.5)
NEUTROPHILS # BLD AUTO: 3.04 K/UL — SIGNIFICANT CHANGE UP (ref 1.4–6.5)
NEUTROPHILS NFR BLD AUTO: 57.5 % — SIGNIFICANT CHANGE UP (ref 42.2–75.2)
NEUTROPHILS NFR BLD AUTO: 57.5 % — SIGNIFICANT CHANGE UP (ref 42.2–75.2)
NON HDL CHOLESTEROL: 116 MG/DL — SIGNIFICANT CHANGE UP
NON HDL CHOLESTEROL: 116 MG/DL — SIGNIFICANT CHANGE UP
NRBC # BLD: 0 /100 WBCS — SIGNIFICANT CHANGE UP (ref 0–0)
NRBC # BLD: 0 /100 WBCS — SIGNIFICANT CHANGE UP (ref 0–0)
PLATELET # BLD AUTO: 193 K/UL — SIGNIFICANT CHANGE UP (ref 130–400)
PLATELET # BLD AUTO: 193 K/UL — SIGNIFICANT CHANGE UP (ref 130–400)
PMV BLD: 9.8 FL — SIGNIFICANT CHANGE UP (ref 7.4–10.4)
PMV BLD: 9.8 FL — SIGNIFICANT CHANGE UP (ref 7.4–10.4)
POTASSIUM SERPL-MCNC: 4.3 MMOL/L — SIGNIFICANT CHANGE UP (ref 3.5–5)
POTASSIUM SERPL-MCNC: 4.3 MMOL/L — SIGNIFICANT CHANGE UP (ref 3.5–5)
POTASSIUM SERPL-SCNC: 4.3 MMOL/L — SIGNIFICANT CHANGE UP (ref 3.5–5)
POTASSIUM SERPL-SCNC: 4.3 MMOL/L — SIGNIFICANT CHANGE UP (ref 3.5–5)
PROT SERPL-MCNC: 7 G/DL — SIGNIFICANT CHANGE UP (ref 6–8)
PROT SERPL-MCNC: 7 G/DL — SIGNIFICANT CHANGE UP (ref 6–8)
RBC # BLD: 4.08 M/UL — LOW (ref 4.7–6.1)
RBC # BLD: 4.08 M/UL — LOW (ref 4.7–6.1)
RBC # FLD: 13.2 % — SIGNIFICANT CHANGE UP (ref 11.5–14.5)
RBC # FLD: 13.2 % — SIGNIFICANT CHANGE UP (ref 11.5–14.5)
SODIUM SERPL-SCNC: 141 MMOL/L — SIGNIFICANT CHANGE UP (ref 135–146)
SODIUM SERPL-SCNC: 141 MMOL/L — SIGNIFICANT CHANGE UP (ref 135–146)
TRIGL SERPL-MCNC: 237 MG/DL — HIGH
TRIGL SERPL-MCNC: 237 MG/DL — HIGH
TROPONIN T SERPL-MCNC: <0.01 NG/ML — SIGNIFICANT CHANGE UP
TSH SERPL-MCNC: 4.59 UIU/ML — HIGH (ref 0.27–4.2)
TSH SERPL-MCNC: 4.59 UIU/ML — HIGH (ref 0.27–4.2)
WBC # BLD: 5.28 K/UL — SIGNIFICANT CHANGE UP (ref 4.8–10.8)
WBC # BLD: 5.28 K/UL — SIGNIFICANT CHANGE UP (ref 4.8–10.8)
WBC # FLD AUTO: 5.28 K/UL — SIGNIFICANT CHANGE UP (ref 4.8–10.8)
WBC # FLD AUTO: 5.28 K/UL — SIGNIFICANT CHANGE UP (ref 4.8–10.8)

## 2023-12-07 PROCEDURE — 99239 HOSP IP/OBS DSCHRG MGMT >30: CPT

## 2023-12-07 PROCEDURE — 99222 1ST HOSP IP/OBS MODERATE 55: CPT

## 2023-12-07 RX ORDER — RANOLAZINE 500 MG/1
1 TABLET, FILM COATED, EXTENDED RELEASE ORAL
Qty: 60 | Refills: 0
Start: 2023-12-07 | End: 2024-01-05

## 2023-12-07 RX ORDER — ATORVASTATIN CALCIUM 80 MG/1
1 TABLET, FILM COATED ORAL
Refills: 0 | DISCHARGE

## 2023-12-07 RX ORDER — RANOLAZINE 500 MG/1
1 TABLET, FILM COATED, EXTENDED RELEASE ORAL
Refills: 0 | DISCHARGE

## 2023-12-07 RX ORDER — METOPROLOL TARTRATE 50 MG
1 TABLET ORAL
Qty: 60 | Refills: 0
Start: 2023-12-07 | End: 2024-01-05

## 2023-12-07 RX ORDER — CLOPIDOGREL BISULFATE 75 MG/1
1 TABLET, FILM COATED ORAL
Refills: 0 | DISCHARGE

## 2023-12-07 RX ORDER — ATORVASTATIN CALCIUM 80 MG/1
1 TABLET, FILM COATED ORAL
Qty: 30 | Refills: 0
Start: 2023-12-07 | End: 2024-01-05

## 2023-12-07 RX ORDER — CLOPIDOGREL BISULFATE 75 MG/1
1 TABLET, FILM COATED ORAL
Qty: 30 | Refills: 0
Start: 2023-12-07 | End: 2024-01-05

## 2023-12-07 RX ADMIN — Medication 2: at 08:07

## 2023-12-07 RX ADMIN — GABAPENTIN 300 MILLIGRAM(S): 400 CAPSULE ORAL at 06:24

## 2023-12-07 RX ADMIN — Medication 4: at 11:49

## 2023-12-07 RX ADMIN — CLOPIDOGREL BISULFATE 75 MILLIGRAM(S): 75 TABLET, FILM COATED ORAL at 11:50

## 2023-12-07 RX ADMIN — RANOLAZINE 500 MILLIGRAM(S): 500 TABLET, FILM COATED, EXTENDED RELEASE ORAL at 08:32

## 2023-12-07 RX ADMIN — Medication 100 MILLIGRAM(S): at 11:50

## 2023-12-07 RX ADMIN — Medication 25 MILLIGRAM(S): at 08:32

## 2023-12-07 RX ADMIN — Medication 81 MILLIGRAM(S): at 11:50

## 2023-12-07 NOTE — DISCHARGE NOTE NURSING/CASE MANAGEMENT/SOCIAL WORK - PATIENT PORTAL LINK FT
You can access the FollowMyHealth Patient Portal offered by [facility] by registering at the following website: [email or web address]. By joining [employer]s FollowMyHealth portal, you will also be able to view your health information using other applications (apps) compatible with our system. You can access the FollowMyHealth Patient Portal offered by [facility] by registering at the following website: [email or web address]. By joining [employer]’s FollowMyHealth portal, you will also be able to view your health information using other applications (apps) compatible with our system.

## 2023-12-07 NOTE — CONSULT NOTE ADULT - ASSESSMENT
Angina stable  maximize GMT  continue with DAPT  have a RCA  with good collaterals from the left  if failed medical therapy with schedule a PCI of the  as out patient  discussed with medical team and daughter

## 2023-12-07 NOTE — CONSULT NOTE ADULT - SUBJECTIVE AND OBJECTIVE BOX
Date of Admission:    CHIEF COMPLAINT: Chest pain    HISTORY OF PRESENT ILLNESS: 73yMale with PMH below presented to the hospital for chest pain on and off.    PAST MEDICAL & SURGICAL HISTORY:  HTN (hypertension)      Diabetes      Stented coronary artery      CAD (coronary artery disease)      HLD (hyperlipidemia)      History of percutaneous angioplasty      H/O rotator cuff surgery        HEALTH ISSUES - PROBLEM Dx:        FAMILY HISTORY:    Allergies    No Known Allergies    Intolerances    	  Home Medications:  atorvastatin 80 mg oral tablet: 1 tab(s) orally once a day (at bedtime) (06 Dec 2023 15:21)  gabapentin 300 mg oral capsule: 1 cap(s) orally 2 times a day (06 Dec 2023 15:21)  metFORMIN 1000 mg oral tablet: 1 tab(s) orally 2 times a day HOLD METFORMIN FOR 48 HR AFTER CARDIAC CATH (06 Dec 2023 15:21)  Plavix 75 mg oral tablet: 1 tab(s) orally once a day (06 Dec 2023 15:21)  ranolazine 500 mg oral tablet, extended release: 1 tab(s) orally 2 times a day (06 Dec 2023 15:21)  topiramate 100 mg oral tablet: 1 tab(s) orally once a day (06 Dec 2023 15:21)    MEDICATIONS  (STANDING):  aspirin  chewable 81 milliGRAM(s) Oral daily  atorvastatin 80 milliGRAM(s) Oral at bedtime  clopidogrel Tablet 75 milliGRAM(s) Oral daily  dextrose 5%. 1000 milliLiter(s) (50 mL/Hr) IV Continuous <Continuous>  dextrose 50% Injectable 25 Gram(s) IV Push once  enoxaparin Injectable 40 milliGRAM(s) SubCutaneous every 24 hours  gabapentin 300 milliGRAM(s) Oral two times a day  glucagon  Injectable 1 milliGRAM(s) IntraMuscular once  insulin lispro (ADMELOG) corrective regimen sliding scale   SubCutaneous three times a day before meals  metoprolol tartrate 25 milliGRAM(s) Oral two times a day  ranolazine 500 milliGRAM(s) Oral two times a day  topiramate 100 milliGRAM(s) Oral daily    MEDICATIONS  (PRN):  acetaminophen     Tablet .. 650 milliGRAM(s) Oral every 6 hours PRN Temp greater or equal to 38C (100.4F), Mild Pain (1 - 3)  dextrose Oral Gel 15 Gram(s) Oral once PRN Blood Glucose LESS THAN 70 milliGRAM(s)/deciliter              SOCIAL HISTORY:    [ ] Non-smoker  [ ] Smoker  [ ] Alcohol      REVIEW OF SYSTEMS:  CONSTITUTIONAL: No fever, weight loss, or fatigue  CARDIOLOGY: PAtient denies chest pain, shortness of breath or syncopal episodes.   RESPIRATORY: denies shortness of breath, wheezeing.   NEUROLOGICAL: NO weakness, no focal deficits to report.  ENDOCRINOLOGICAL: no recent change in diabetic medications.   GI: no BRBPR, no N,V,diarrhea.    PSYCHIATRY: normal mood and affect  HEENT: no nasal discharge, no ecchymosis  SKIN: no ecchymosis, no breakdown  MUSCULOSKELETAL: Full range of motion x4.      PHYSICAL EXAM:  T(C): 35.4 (23 @ 05:12), Max: 36.8 (23 @ 15:49)  HR: 62 (23 @ 05:12) (62 - 69)  BP: 168/74 (23 @ 05:12) (125/59 - 168/74)  RR: 18 (23 @ 22:58) (16 - 18)  SpO2: 97% (23 @ 22:58) (97% - 99%)  Wt(kg): --  I&O's Summary    06 Dec 2023 07:01  -  07 Dec 2023 07:00  --------------------------------------------------------  IN: 0 mL / OUT: 600 mL / NET: -600 mL      Daily     Daily Weight in k.4 (07 Dec 2023 05:12)    General Appearance: Normal	  Cardiovascular: Normal S1 S2, No JVD, No murmurs, No edema  Respiratory: Lungs clear to auscultation	  Psychiatry: A & O x 3, Mood & affect appropriate  Gastrointestinal:  Soft, Non-tender  Skin: No rashes, No ecchymoses, No cyanosis	  Neurologic: Non-focal  Extremities: Normal range of motion, No clubbing, cyanosis or edema  Vascular: Peripheral pulses palpable 2+ bilaterally        LABS:	 	                          12.9   5.28  )-----------( 193      ( 07 Dec 2023 06:44 )             38.1         141  |  106  |  17  ----------------------------<  186<H>  4.3   |  23  |  1.0    Ca    9.4      07 Dec 2023 06:44    TPro  7.0  /  Alb  4.3  /  TBili  0.3  /  DBili  x   /  AST  15  /  ALT  16  /  AlkPhos  100      CARDIAC MARKERS ( 07 Dec 2023 06:44 )  x     / <0.01 ng/mL / x     / x     / x      CARDIAC MARKERS ( 06 Dec 2023 21:26 )  x     / <0.01 ng/mL / x     / x     / x      CARDIAC MARKERS ( 06 Dec 2023 17:43 )  x     / <0.01 ng/mL / x     / x     / x      CARDIAC MARKERS ( 06 Dec 2023 10:10 )  x     / <0.01 ng/mL / x     / x     / x          PT/INR - ( 06 Dec 2023 10:10 )   PT: 11.20 sec;   INR: 0.98 ratio         PTT - ( 06 Dec 2023 10:10 )  PTT:32.3 sec    proBNP:   Lipid Profile:   HgA1c:   TSH:       CARDIAC MARKERS:            TELEMETRY EVENTS: 	    ECG:  	  RADIOLOGY:  OTHER: 	    PREVIOUS DIAGNOSTIC TESTING:    [ ] Echocardiogram:  [ ]  Catheterization:  [ ] Stress Test:  	  	  ASSESSMENT/PLAN:

## 2023-12-07 NOTE — DISCHARGE NOTE PROVIDER - NSDCMRMEDTOKEN_GEN_ALL_CORE_FT
atorvastatin 80 mg oral tablet: 1 tab(s) orally once a day (at bedtime)  gabapentin 300 mg oral capsule: 1 cap(s) orally 2 times a day  metFORMIN 1000 mg oral tablet: 1 tab(s) orally 2 times a day HOLD METFORMIN FOR 48 HR AFTER CARDIAC CATH  metoprolol tartrate 25 mg oral tablet: 1 tab(s) orally 2 times a day  Plavix 75 mg oral tablet: 1 tab(s) orally once a day  ranolazine 500 mg oral tablet, extended release: 1 tab(s) orally 2 times a day  topiramate 100 mg oral tablet: 1 tab(s) orally once a day

## 2023-12-07 NOTE — PROGRESS NOTE ADULT - SUBJECTIVE AND OBJECTIVE BOX
MICHELLE HAAS 73y Male  MRN#: [id number]   Hospital Day: 1d    HPI:  73-year-old male with history of CAD status post stents in 2007, recent cath in October with Dr Stevens with  of RCA and instant restenosis, hypertension, HLD, Diabetes presents to the ED complaining of pressure like CP with shortness of breath that started last night.  Patient stated had similar symptoms this morning. Pt states the pain as 6/10 without alleviating or aggravating factors. Currently pt is not c/o Pain.      In the ED pts VS were T(C): 37.2  T(F): 98.9  HR: 74  BP: 132/68  RR: 16  SpO2: 95%  Trops negative x 1      Pt is admitted for further workup and management of CP        OBJECTIVE  PAST MEDICAL & SURGICAL HISTORY  HTN (hypertension)    Diabetes    Stented coronary artery    CAD (coronary artery disease)    HLD (hyperlipidemia)    History of percutaneous angioplasty    H/O rotator cuff surgery      ALLERGIES:  No Known Allergies    MEDICATIONS:  STANDING MEDICATIONS  aspirin  chewable 81 milliGRAM(s) Oral daily  atorvastatin 80 milliGRAM(s) Oral at bedtime  clopidogrel Tablet 75 milliGRAM(s) Oral daily  dextrose 5%. 1000 milliLiter(s) IV Continuous <Continuous>  dextrose 50% Injectable 25 Gram(s) IV Push once  enoxaparin Injectable 40 milliGRAM(s) SubCutaneous every 24 hours  gabapentin 300 milliGRAM(s) Oral two times a day  glucagon  Injectable 1 milliGRAM(s) IntraMuscular once  insulin lispro (ADMELOG) corrective regimen sliding scale   SubCutaneous three times a day before meals  metoprolol tartrate 25 milliGRAM(s) Oral two times a day  ranolazine 500 milliGRAM(s) Oral two times a day  topiramate 100 milliGRAM(s) Oral daily    PRN MEDICATIONS  acetaminophen     Tablet .. 650 milliGRAM(s) Oral every 6 hours PRN  dextrose Oral Gel 15 Gram(s) Oral once PRN      VITAL SIGNS: Last 24 Hours  T(C): 35.4 (07 Dec 2023 05:12), Max: 37.2 (06 Dec 2023 09:25)  T(F): 95.8 (07 Dec 2023 05:12), Max: 98.9 (06 Dec 2023 09:25)  HR: 62 (07 Dec 2023 05:12) (62 - 74)  BP: 168/74 (07 Dec 2023 05:12) (125/59 - 168/74)  BP(mean): 107 (07 Dec 2023 05:12) (107 - 107)  RR: 18 (06 Dec 2023 22:58) (16 - 18)  SpO2: 97% (06 Dec 2023 22:58) (95% - 99%)    LABS:                        12.9   5.28  )-----------( 193      ( 07 Dec 2023 06:44 )             38.1     12-07    141  |  106  |  17  ----------------------------<  186<H>  4.3   |  23  |  1.0    Ca    9.4      07 Dec 2023 06:44    TPro  7.0  /  Alb  4.3  /  TBili  0.3  /  DBili  x   /  AST  15  /  ALT  16  /  AlkPhos  100  12-07    PT/INR - ( 06 Dec 2023 10:10 )   PT: 11.20 sec;   INR: 0.98 ratio         PTT - ( 06 Dec 2023 10:10 )  PTT:32.3 sec  Urinalysis Basic - ( 07 Dec 2023 06:44 )    Color: x / Appearance: x / SG: x / pH: x  Gluc: 186 mg/dL / Ketone: x  / Bili: x / Urobili: x   Blood: x / Protein: x / Nitrite: x   Leuk Esterase: x / RBC: x / WBC x   Sq Epi: x / Non Sq Epi: x / Bacteria: x        Troponin T, Serum: <0.01 ng/mL (12-07-23 @ 06:44)  Troponin T, Serum: <0.01 ng/mL (12-06-23 @ 21:26)  Troponin T, Serum: <0.01 ng/mL (12-06-23 @ 17:43)  Troponin T, Serum: <0.01 ng/mL (12-06-23 @ 10:10)      CARDIAC MARKERS ( 07 Dec 2023 06:44 )  x     / <0.01 ng/mL / x     / x     / x      CARDIAC MARKERS ( 06 Dec 2023 21:26 )  x     / <0.01 ng/mL / x     / x     / x      CARDIAC MARKERS ( 06 Dec 2023 17:43 )  x     / <0.01 ng/mL / x     / x     / x      CARDIAC MARKERS ( 06 Dec 2023 10:10 )  x     / <0.01 ng/mL / x     / x     / x              PHYSICAL EXAM:  GENERAL: NAD.  HEAD:  Atraumatic, Normocephalic.  EYES: conjunctiva and sclera clear  CHEST/LUNG: GBAE. No wheezing or crackles   HEART: regular rate and rhythm; S1/S2. no murmurs or additonal heart sounds. no rubs.  ABDOMEN: Soft, Nontender, Nondistended  EXTREMITIES: No edema. intact PP.  PSYCH: AAOx3.  NEUROLOGY: non-focal; moves all extremities     MICHELLE HAAS 73y Male  MRN#: [id number]   Hospital Day: 1d    HPI:  73-year-old male with history of CAD status post stents in 2007, recent cath in October with Dr Stevens with  of RCA and instant restenosis, hypertension, HLD, Diabetes presents to the ED complaining of pressure like CP with shortness of breath that started last night.  Patient stated had similar symptoms this morning. Pt states the pain as 6/10 without alleviating or aggravating factors. Currently pt is not c/o Pain.      In the ED pts VS were T(C): 37.2  T(F): 98.9  HR: 74  BP: 132/68  RR: 16  SpO2: 95%  Trops negative x 1      Pt is admitted for further workup and management of CP        OBJECTIVE  PAST MEDICAL & SURGICAL HISTORY  HTN (hypertension)    Diabetes    Stented coronary artery    CAD (coronary artery disease)    HLD (hyperlipidemia)    History of percutaneous angioplasty    H/O rotator cuff surgery      ALLERGIES:  No Known Allergies    MEDICATIONS:  STANDING MEDICATIONS  aspirin  chewable 81 milliGRAM(s) Oral daily  atorvastatin 80 milliGRAM(s) Oral at bedtime  clopidogrel Tablet 75 milliGRAM(s) Oral daily  dextrose 5%. 1000 milliLiter(s) IV Continuous <Continuous>  dextrose 50% Injectable 25 Gram(s) IV Push once  enoxaparin Injectable 40 milliGRAM(s) SubCutaneous every 24 hours  gabapentin 300 milliGRAM(s) Oral two times a day  glucagon  Injectable 1 milliGRAM(s) IntraMuscular once  insulin lispro (ADMELOG) corrective regimen sliding scale   SubCutaneous three times a day before meals  metoprolol tartrate 25 milliGRAM(s) Oral two times a day  ranolazine 500 milliGRAM(s) Oral two times a day  topiramate 100 milliGRAM(s) Oral daily    PRN MEDICATIONS  acetaminophen     Tablet .. 650 milliGRAM(s) Oral every 6 hours PRN  dextrose Oral Gel 15 Gram(s) Oral once PRN      VITAL SIGNS: Last 24 Hours  T(C): 35.4 (07 Dec 2023 05:12), Max: 37.2 (06 Dec 2023 09:25)  T(F): 95.8 (07 Dec 2023 05:12), Max: 98.9 (06 Dec 2023 09:25)  HR: 62 (07 Dec 2023 05:12) (62 - 74)  BP: 168/74 (07 Dec 2023 05:12) (125/59 - 168/74)  BP(mean): 107 (07 Dec 2023 05:12) (107 - 107)  RR: 18 (06 Dec 2023 22:58) (16 - 18)  SpO2: 97% (06 Dec 2023 22:58) (95% - 99%)    LABS:                        12.9   5.28  )-----------( 193      ( 07 Dec 2023 06:44 )             38.1     12-07    141  |  106  |  17  ----------------------------<  186<H>  4.3   |  23  |  1.0    Ca    9.4      07 Dec 2023 06:44    TPro  7.0  /  Alb  4.3  /  TBili  0.3  /  DBili  x   /  AST  15  /  ALT  16  /  AlkPhos  100  12-07    PT/INR - ( 06 Dec 2023 10:10 )   PT: 11.20 sec;   INR: 0.98 ratio         PTT - ( 06 Dec 2023 10:10 )  PTT:32.3 sec  Urinalysis Basic - ( 07 Dec 2023 06:44 )    Color: x / Appearance: x / SG: x / pH: x  Gluc: 186 mg/dL / Ketone: x  / Bili: x / Urobili: x   Blood: x / Protein: x / Nitrite: x   Leuk Esterase: x / RBC: x / WBC x   Sq Epi: x / Non Sq Epi: x / Bacteria: x        Troponin T, Serum: <0.01 ng/mL (12-07-23 @ 06:44)  Troponin T, Serum: <0.01 ng/mL (12-06-23 @ 21:26)  Troponin T, Serum: <0.01 ng/mL (12-06-23 @ 17:43)  Troponin T, Serum: <0.01 ng/mL (12-06-23 @ 10:10)      CARDIAC MARKERS ( 07 Dec 2023 06:44 )  x     / <0.01 ng/mL / x     / x     / x      CARDIAC MARKERS ( 06 Dec 2023 21:26 )  x     / <0.01 ng/mL / x     / x     / x      CARDIAC MARKERS ( 06 Dec 2023 17:43 )  x     / <0.01 ng/mL / x     / x     / x      CARDIAC MARKERS ( 06 Dec 2023 10:10 )  x     / <0.01 ng/mL / x     / x     / x              PHYSICAL EXAM:  GENERAL: NAD.  HEAD:  Atraumatic, Normocephalic.  EYES: conjunctiva and sclera clear  CHEST/LUNG: GBAE. No wheezing or crackles   HEART: regular rate and rhythm; S1/S2. no murmurs or additonal heart sounds. no rubs.  ABDOMEN: Soft, Nontender, Nondistended  EXTREMITIES: No edema. intact PP.  PSYCH: AAOx3.  NEUROLOGY: non-focal; moves all extremities

## 2023-12-07 NOTE — DISCHARGE NOTE PROVIDER - HOSPITAL COURSE
73-year-old male with history of CAD status post stents in 2007, recent cath in October with Dr Stevens with  of RCA and instant restenosis, hypertension, HLD, Diabetes presents to the ED complaining of pressure like CP with shortness of breath that started last night.  Patient stated had similar symptoms this morning. Pt states the pain as 6/10 without alleviating or aggravating factors. Currently pt is not c/o Pain.    In the ED pts VS were T(C): 37.2  T(F): 98.9  HR: 74  BP: 132/68  RR: 16  SpO2: 95%  Trops negative x 1    Pt is admitted for further workup and management of CP  # Atypical Chest Pain likely Unstable angina  #CAD  -  RCA s/p stent 2007. Recent cath in 10/23   - EF 56% in 10/23.  - CXR negative  - Trops negative x 2  - s/p aspirin 325 mg stat  - c/w aspirin 81 mg qd  - c/w atorvastatin 80 mg  - c/w metoprolol 25mg BID  - c/w plavix 75 mg qd    Cardiology Consultation  Angina stable  maximize GMT  continue with DAPT  have a RCA  with good collaterals from the left  if failed medical therapy with schedule a PCI of the  as out patient  discussed with medical team and daughter    # HLD  - C/w atorvastatin 80    # DM II  - hold home meds  - start SS  - check HbA1c  - monitor BG    Patient was examined at bedside, has no active complaints is vitally stable and ready to be discharged.

## 2023-12-07 NOTE — DISCHARGE NOTE NURSING/CASE MANAGEMENT/SOCIAL WORK - NSDCPEFALRISK_GEN_ALL_CORE
For information on Fall & Injury Prevention, visit: [email or web address] OR  [email or web address] OR  https://www.cdc.gov/steadi/patient.html For information on Fall & Injury Prevention, visit: [email or web address] OR  [email or web address] OR  https://www.cdc.gov/steadi/patient.html

## 2023-12-07 NOTE — PROGRESS NOTE ADULT - ASSESSMENT
73-year-old male with history of CAD status post stents in 2007, recent cath in October with Dr Stevens with  of RCA and instant restenosis, hypertension, HLD, Diabetes presents to the ED complaining of pressure like CP with shortness of breath that started last night.  Patient stated had similar symptoms this morning. Pt states the pain as 6/10 without alleviating or aggravating factors. Currently pt is not c/o Pain.      # Atypical Chest Pain likely Unstable angina  #CAD  -  RCA s/p stent 2007. Recent cath in 10/23   - EF 56% in 10/23.  - CXR negative  - Trops negative x 1  - check serial EKGs and Johana   - check HbA1c, lipid profile and 2 D ehco  - s/p aspirin 325 mg stat  - c/w aspirin 81 mg qd  - c/w atorvastatin 80 mg  - c/w metoprolol 25mg BID  - c/w plavix 75 mg qd  - Nitro PRN  - f/u Cardio cs      # HLD  - C/w atorvastatin 80    # DM II  - hold home meds  - start SS  - check HbA1c  - monitor BG      #Misc  - DVT Prophylaxis:  Lovenox S/C  - GI Prophylaxis:  Not needed  - Diet: DASH

## 2023-12-07 NOTE — DISCHARGE NOTE PROVIDER - CARE PROVIDER_API CALL
Adonay Stevens  Interventional Cardiology  [address] [place] [id number]  Phone: [phone]  Fax: [phone]  Follow Up Time: 1 week   Adonay Stevens  Interventional Cardiology  [address] [place] [id number]  Phone: [phone]  Fax: [phone]  Follow Up Time: 1 week

## 2023-12-07 NOTE — DISCHARGE NOTE PROVIDER - PROVIDER TOKENS
PROVIDER:[TOKEN:[[id number]],FOLLOWUP:[1 week]] PROVIDER:[TOKEN:[[id number]],FOLLOWUP:[1 week]]

## 2023-12-07 NOTE — DISCHARGE NOTE PROVIDER - NSDCCPCAREPLAN_GEN_ALL_CORE_FT
PRINCIPAL DISCHARGE DIAGNOSIS  Diagnosis: Chest pain  Assessment and Plan of Treatment: You presented with complaints of chest pain. Workup was done including EKG, Cardiac enzymes and telemetry monitoring all of which were unremarkable. Please continue taking all your medications and follow up with your cardiologist as outpatient.

## 2023-12-12 DIAGNOSIS — Z95.5 PRESENCE OF CORONARY ANGIOPLASTY IMPLANT AND GRAFT: ICD-10-CM

## 2023-12-12 DIAGNOSIS — I25.110 ATHEROSCLEROTIC HEART DISEASE OF NATIVE CORONARY ARTERY WITH UNSTABLE ANGINA PECTORIS: ICD-10-CM

## 2023-12-12 DIAGNOSIS — I10 ESSENTIAL (PRIMARY) HYPERTENSION: ICD-10-CM

## 2023-12-12 DIAGNOSIS — E78.5 HYPERLIPIDEMIA, UNSPECIFIED: ICD-10-CM

## 2023-12-12 DIAGNOSIS — E11.9 TYPE 2 DIABETES MELLITUS WITHOUT COMPLICATIONS: ICD-10-CM

## 2023-12-19 ENCOUNTER — APPOINTMENT (OUTPATIENT)
Dept: CARDIOLOGY | Facility: CLINIC | Age: 73
End: 2023-12-19
Payer: MEDICARE

## 2023-12-19 VITALS
HEART RATE: 69 BPM | DIASTOLIC BLOOD PRESSURE: 70 MMHG | SYSTOLIC BLOOD PRESSURE: 126 MMHG | BODY MASS INDEX: 25.01 KG/M2 | WEIGHT: 165 LBS | HEIGHT: 68 IN

## 2023-12-19 DIAGNOSIS — Z95.5 PRESENCE OF CORONARY ANGIOPLASTY IMPLANT AND GRAFT: ICD-10-CM

## 2023-12-19 DIAGNOSIS — I25.10 ATHEROSCLEROTIC HEART DISEASE OF NATIVE CORONARY ARTERY W/OUT ANGINA PECTORIS: ICD-10-CM

## 2023-12-19 DIAGNOSIS — I20.9 ANGINA PECTORIS, UNSPECIFIED: ICD-10-CM

## 2023-12-19 PROBLEM — Z00.00 ENCOUNTER FOR PREVENTIVE HEALTH EXAMINATION: Status: ACTIVE | Noted: 2023-12-19

## 2023-12-19 PROCEDURE — 99204 OFFICE O/P NEW MOD 45 MIN: CPT | Mod: 25

## 2023-12-19 PROCEDURE — 93000 ELECTROCARDIOGRAM COMPLETE: CPT

## 2023-12-19 RX ORDER — CLOPIDOGREL 75 MG/1
75 TABLET, FILM COATED ORAL
Refills: 0 | Status: ACTIVE | COMMUNITY

## 2023-12-19 RX ORDER — GABAPENTIN 300 MG/1
300 CAPSULE ORAL TWICE DAILY
Refills: 0 | Status: ACTIVE | COMMUNITY

## 2023-12-19 RX ORDER — METOPROLOL TARTRATE 75 MG/1
TABLET, FILM COATED ORAL
Refills: 0 | Status: ACTIVE | COMMUNITY

## 2023-12-19 RX ORDER — METFORMIN HYDROCHLORIDE 1000 MG/1
1000 TABLET, COATED ORAL
Refills: 0 | Status: ACTIVE | COMMUNITY

## 2023-12-19 RX ORDER — ATORVASTATIN CALCIUM 80 MG/1
80 TABLET, FILM COATED ORAL
Refills: 0 | Status: ACTIVE | COMMUNITY

## 2023-12-19 RX ORDER — RANOLAZINE 500 MG/1
500 TABLET, EXTENDED RELEASE ORAL
Refills: 0 | Status: ACTIVE | COMMUNITY

## 2023-12-19 RX ORDER — TOPIRAMATE 100 MG/1
100 TABLET, FILM COATED ORAL
Refills: 0 | Status: ACTIVE | COMMUNITY

## 2023-12-19 NOTE — ASSESSMENT
[FreeTextEntry1] : CAD Status post PCI Status postcardiac catheterization Hyperlipidemia Angina class I lumbar spine/stenosis, herniated discs/cervical spine/thoracic spine

## 2023-12-19 NOTE — HISTORY OF PRESENT ILLNESS
[FreeTextEntry1] : The patient has known coronary artery disease. He has a stent in the right coronary artery done over 10 years ago. He recently was admitted to [facility] with chest pain. Cardiac catheterization performed by diet or cardiac revealed total occlusion of right coronary artery with collaterals from the left system.  There is a nonsignificant nonobstructive LAD, left main and circumflex disease. The patient was told that possibly he could benefit by PCI for chronically occluded right coronary artery and now seeks second opinion. Presently the patient is asymptomatic. He is class I in terms of chest pain or shortness of breath. His EKG is normal sinus rhythm. He prefers medical therapy at the present time.

## 2023-12-19 NOTE — DISCUSSION/SUMMARY
[FreeTextEntry1] : At the present time, the patient is following up with a doctor in [facility]. It is recommended that the patient continue medical therapy. He is asymptomatic and presently would not benefit PCI by revascularization of the totally occluded right coronary artery. Recommend medical therapy

## 2024-01-26 NOTE — ASU DISCHARGE PLAN (ADULT/PEDIATRIC) - PROVIDER TOKENS
Med list from [facility] where patient resides went over and updated in the chart.   PROVIDER:[TOKEN:[[id number]],FOLLOWUP:[1 week],ESTABLISHEDPATIENT:[T]]

## 2025-08-20 ENCOUNTER — APPOINTMENT (OUTPATIENT)
Dept: VASCULAR SURGERY | Facility: CLINIC | Age: 75
End: 2025-08-20
Payer: MEDICARE

## 2025-08-20 VITALS
DIASTOLIC BLOOD PRESSURE: 75 MMHG | WEIGHT: 172 LBS | HEIGHT: 67 IN | BODY MASS INDEX: 27 KG/M2 | HEART RATE: 75 BPM | SYSTOLIC BLOOD PRESSURE: 114 MMHG

## 2025-08-20 DIAGNOSIS — Z78.9 OTHER SPECIFIED HEALTH STATUS: ICD-10-CM

## 2025-08-20 DIAGNOSIS — Z86.39 PERSONAL HISTORY OF OTHER ENDOCRINE, NUTRITIONAL AND METABOLIC DISEASE: ICD-10-CM

## 2025-08-20 DIAGNOSIS — I70.244 ATHEROSCLEROSIS OF NATIVE ARTERIES OF LEFT LEG WITH ULCERATION OF HEEL AND MIDFOOT: ICD-10-CM

## 2025-08-20 DIAGNOSIS — E11.43 TYPE 2 DIABETES MELLITUS WITH DIABETIC AUTONOMIC (POLY)NEUROPATHY: ICD-10-CM

## 2025-08-20 DIAGNOSIS — E11.42 TYPE 2 DIABETES MELLITUS WITH DIABETIC POLYNEUROPATHY: ICD-10-CM

## 2025-08-20 DIAGNOSIS — Z98.61 CORONARY ANGIOPLASTY STATUS: ICD-10-CM

## 2025-08-20 DIAGNOSIS — Z87.891 PERSONAL HISTORY OF NICOTINE DEPENDENCE: ICD-10-CM

## 2025-08-20 DIAGNOSIS — Z86.79 PERSONAL HISTORY OF OTHER DISEASES OF THE CIRCULATORY SYSTEM: ICD-10-CM

## 2025-08-20 DIAGNOSIS — F10.90 ALCOHOL USE, UNSPECIFIED, UNCOMPLICATED: ICD-10-CM

## 2025-08-20 PROCEDURE — 99203 OFFICE O/P NEW LOW 30 MIN: CPT

## 2025-08-20 PROCEDURE — 93925 LOWER EXTREMITY STUDY: CPT

## 2025-08-20 RX ORDER — CLOPIDOGREL BISULFATE 75 MG/1
75 TABLET, FILM COATED ORAL
Refills: 0 | Status: ACTIVE | COMMUNITY

## 2025-08-20 RX ORDER — DOXYCYCLINE HYCLATE 100 MG/1
100 TABLET, COATED ORAL
Refills: 0 | Status: ACTIVE | COMMUNITY

## 2025-08-20 RX ORDER — EMPAGLIFLOZIN 25 MG/1
25 TABLET, FILM COATED ORAL
Refills: 0 | Status: ACTIVE | COMMUNITY

## 2025-08-20 RX ORDER — GLIPIZIDE 2.5 MG/1
2.5 TABLET ORAL
Refills: 0 | Status: ACTIVE | COMMUNITY

## 2025-08-20 RX ORDER — ATORVASTATIN CALCIUM 80 MG/1
80 TABLET, FILM COATED ORAL
Refills: 0 | Status: ACTIVE | COMMUNITY

## 2025-08-20 RX ORDER — CEPHALEXIN 500 MG/1
500 CAPSULE ORAL
Refills: 0 | Status: ACTIVE | COMMUNITY

## 2025-09-03 ENCOUNTER — APPOINTMENT (OUTPATIENT)
Dept: VASCULAR SURGERY | Facility: CLINIC | Age: 75
End: 2025-09-03
Payer: MEDICARE

## 2025-09-03 VITALS
WEIGHT: 172 LBS | DIASTOLIC BLOOD PRESSURE: 77 MMHG | HEIGHT: 67 IN | BODY MASS INDEX: 27 KG/M2 | SYSTOLIC BLOOD PRESSURE: 121 MMHG

## 2025-09-03 DIAGNOSIS — I70.244 ATHEROSCLEROSIS OF NATIVE ARTERIES OF LEFT LEG WITH ULCERATION OF HEEL AND MIDFOOT: ICD-10-CM

## 2025-09-03 PROCEDURE — 99204 OFFICE O/P NEW MOD 45 MIN: CPT
